# Patient Record
Sex: MALE | Race: WHITE | HISPANIC OR LATINO | URBAN - METROPOLITAN AREA
[De-identification: names, ages, dates, MRNs, and addresses within clinical notes are randomized per-mention and may not be internally consistent; named-entity substitution may affect disease eponyms.]

---

## 2021-08-11 ENCOUNTER — APPOINTMENT (RX ONLY)
Dept: URBAN - METROPOLITAN AREA CLINIC 37 | Facility: CLINIC | Age: 86
Setting detail: DERMATOLOGY
End: 2021-08-11

## 2021-08-11 ENCOUNTER — APPOINTMENT (RX ONLY)
Dept: URBAN - METROPOLITAN AREA CLINIC 38 | Facility: CLINIC | Age: 86
Setting detail: DERMATOLOGY
End: 2021-08-11

## 2021-08-11 PROBLEM — C44.311 BASAL CELL CARCINOMA OF SKIN OF NOSE: Status: ACTIVE | Noted: 2021-08-11

## 2021-08-11 PROCEDURE — ? TREATMENT REGIMEN

## 2021-08-11 PROCEDURE — 77280 THER RAD SIMULAJ FIELD SMPL: CPT

## 2021-08-11 PROCEDURE — 77300 RADIATION THERAPY DOSE PLAN: CPT

## 2021-08-11 PROCEDURE — G6001 ECHO GUIDANCE RADIOTHERAPY: HCPCS

## 2021-08-11 PROCEDURE — 77334 RADIATION TREATMENT AID(S): CPT

## 2021-08-11 PROCEDURE — 77261 THER RADIOLOGY TX PLNG SMPL: CPT

## 2021-08-11 PROCEDURE — ? SUPERFICIAL RADIATION TREATMENT

## 2021-08-11 PROCEDURE — 99213 OFFICE O/P EST LOW 20 MIN: CPT | Mod: 25

## 2021-08-11 NOTE — PROCEDURE: SUPERFICIAL RADIATION TREATMENT
Body Location Override (Optional): Nasal Supratip
Detail Level: Zone
Pathology Override (Pathology Will Render As Diagnosis Name If Left Blank): Basal Supratip
Field Number: 1
Dimensions-X Axis In Cm: 1.3
Dimensions-Y Axis In Cm: 1.5
Render Patient Eligibility And Selection In Note?: Yes
Render Additional Prescriptions In Note?: No
Please Choose The Type Of Visit (Required): Treatment Planning Visit: Show Non-Treatment Variables
Functional Status: 0 (fully active)
Port Dimensions-X Axis In Cm: 2.5
Field Size (Applicator): 3.0 cm
Patient Positioning: Sitting
Custom Shielding Preamble Text Will Not Be Included With Simple Simulations (.......... X X Y Cm): A lead shield of 0.762 mm thickness is utilized to form a molded, custom shield with a
Custom Shielding Afterword Text Will Not Be Included With Simple Simulations (X X Y Cm............): port to correlate with the lesion size, including treatment margin. The custom lead shield is adequate to accommodate the appropriate applicator and provide adequate shielding around the treatment site. Additional shielding (as noted below) is used to protect sensitive, normal tissues.
Initial Radiation Treatment Planning (Will Render If Bill Simulation = Yes): The patient had a complete consultation regarding all applicable modalities for the treatment of their skin cancer and based on a variety of factors including the type of tumor, size, and location, the relevant medical history as well as local tissue factors, the functional status of the individual, the ability to perform necessary postoperative wound instructions and the need for simultaneous treatments as well as overall wound healing status, it was determined that the patient would begin radiation therapy treatment for skin cancer.  A full simulation and treatment device design was performed including the determination and formulation of appropriate simple and complex devices including lead shield of 0.762 mm thickness to form molded customized shielding to specifically correlate with the lesion size including treatment margin.  The custom lead shield is adequate to accommodate the appropriate applicator and provide adequate shielding around the treatment site.  The specific field applicator, shields, and devices both simple and complex as well as the specific patient setup is outlined below.  The patient was given a full consent for superficial radiation to both verbally and in writing and the full determination of patient's eligibility for treatment and selection is outlined on the patient eligibility and treatment selection form.  The specific superficial radiotherapy prescription was determined and was documented on the superficial radiotherapy prescription form.  A treatment calculation was also performed and documented on the treatment calculation form.  Based on the prescription, the patient was scheduled for a series of fractional treatments.
Simple Simulation Preamble Text Will Be Included With Simple Simulations (.......... Indications): Simple simulation was performed today for the following reasons:
Treatment Device Design After Initial Simulation Justification (Will Render If Bill For Treatment Devices = Yes): The patient is status post radiation simulation and is evaluated as to the use of additional devices for shielding and placement for radiation therapy.
Additional Prescription Justification Text: If there is any interruption in treatment exceeding 5 days please see Decay and Dose Adjustment Calculation and complete treatment under Prescription 2, 3 or 4 as appropriate.
Shielding Size (Optional- Include Units): 2.5 cm
Energy (Optional-Please Include Units): 100 kv
Depth (Optional-Please Include Units): 2.17 mm
Fractions / Week: 3
Time Dose Fractionation (Optional- Include Units If Applicable): 97
Daily Fractionated Dose (Optional- Include Units): 273 cGy
Treatment Time / Fractionation (Optional- Include Units): 0.42 min
Total Number Of Fractions: 20
Total Dose (Optional-Please Include Units): 5460 cGy
Fractions / Week Rx 2: 5
Total Number Of Fractions Rx 2: 15
Dose Per Fractionation In Cgy (Optional): 273
Fractionation Number: 0
Assessment: Appropriate reaction
Intro Statement (Will Not Render If Left Blank): The patient is undergoing superficial radiation therapy for skin cancer and presents for weekly evaluation and management.  Per protocol and as documented on the flow sheet, the patient was questioned as to subjective redness, pruritus, pain, drainage, fatigue, or any other symptoms.  Objectively, the radiation area was evaluated with regards to erythema, atrophy, scale, crusting, erosion, ulceration, edema, purpura, tenderness, warmth, drainage, and any other findings.  The plan was extensively reviewed including the dose, and dosing schedule.  The simulation and clinical setup was also reviewed as was the external and any internal shields and based on this review the appropriateness and sufficiency of treatment was determined.
Information: Selecting Yes will display possible errors in your note based on the variables you have selected. This validation is only offered as a suggestion for you. PLEASE NOTE THAT THE VALIDATION TEXT WILL BE REMOVED WHEN YOU FINALIZE YOUR NOTE. IF YOU WANT TO FAX A PRELIMINARY NOTE YOU WILL NEED TO TOGGLE THIS TO 'NO' IF YOU DO NOT WANT IT IN YOUR FAXED NOTE.

## 2021-08-11 NOTE — PROCEDURE: TREATMENT REGIMEN
Plan: This patient has been treated today with image guided superficial radiation therapy for non-melanoma\\nskin cancer.\\nWritten informed consent has been previously obtained from this patient for this treatment. This\\nconsent is documented in the patient’s chart. The patient gave verbal consent to continue treatment\\ntoday.\\nThe patient was treated with a specific radiation dose and setup as prescribed by the provider listed on\\nthis visit note. A Radiation Therapist performed administration of radiation under supervision of\\nprovider. The treatment parameters and cumulative dose are indicated above.\\nPrior to administering the radiation, the patient underwent a verification therapeutic radiology\\nsimulation-aided field setting defining relevant normal and abnormal target anatomy and acquiring\\nimages with high frequency ultrasound in addition to data necessary developing optimal radiation\\ntreatment process for the patient. This process includes verification of the treatment port(s) and proper\\ntreatment positioning. All treatment ports were photographed within electronic medical record. The\\npatient’s customized lead blocking along with gross tumor volume and margin was\\nconfirmed. Considering superficial radiotherapy is clinical in setup, this requires physician and\\nradiation therapist to clarify location interest being treated against initial images, pathology and\\npatient anatomy. Care was taken ensuring fields treated were geometrically accurate and properly\\npositioned using therapeutic radiology simulation-aided field setting verification per fraction. This\\nprocess is also utilized to determine if any prescription or setup changes are necessary. These steps are\\ntherefore medically necessary ensuring safe and effective administration of radiation. Ongoing\\ntherapeutic radiology simulation-aided field setting verification is ordered throughout course of\\ntherapy.\\nA high frequency ultrasound image was acquired today for two-dimensional evaluation of the tumor\\nvolume and response to treatment, in addition to geometric accuracy of field placement. US depth Is\\n________mm, which is ________mm in difference from previous imaging. The field placement and\\nultrasound imaging, per fraction, is separate and distinct from the initial simulation, and is an\\nimportant task in providing safe administration of superficial radiation therapy. Physician evaluation of\\nthe ultrasound tumor depth will be ongoing throughout the course of treatment, and is deemed\\nmedically necessary in order to ensure the efficacy of treatment and any necessary changes. Today’s\\nimage was evaluated for determination of continuation of treatment with the current plan or with\\nnecessary changes as appropriate. According to provider review of verification therapeutic radiology\\nsimulation-aided field setting and imaging, no change is required. Additionally, the use of ultrasound visualization \\nand targeted assessment allows the patient to be able to see their cancer(s) progress, encouraging patient to\\ncomplete and maintain compliance through full course of radiotherapy.\\nPer Dr. Sousa, continued ultrasound guidance and therapeutic radiology simulation-aided field setting\\nverification per fraction is required for field placement, measurement of tumor depth, progress and\\nacute effect monitoring.\\n\\n
Detail Level: Zone

## 2021-08-11 NOTE — HPI: EVALUATION OF SKIN LESION(S)
How Severe Are Your Spot(S)?: mild
Have Your Spot(S) Been Treated In The Past?: has been treated
Hpi Title: Evaluation of a Skin Lesion
When Was It Treated?: 10/22/2020
Year Removed: 1900

## 2021-08-11 NOTE — PROCEDURE: TREATMENT REGIMEN
Plan: This patient has been treated today with image guided superficial radiation therapy for non-melanoma\\nskin cancer.\\nWritten informed consent has been previously obtained from this patient for this treatment. This\\nconsent is documented in the patient’s chart. The patient gave verbal consent to continue treatment\\ntoday.\\nThe patient was treated with a specific radiation dose and setup as prescribed by the provider listed on\\nthis visit note. A Radiation Therapist performed administration of radiation under supervision of\\nprovider. The treatment parameters and cumulative dose are indicated above.\\nPrior to administering the radiation, the patient underwent a verification therapeutic radiology\\nsimulation-aided field setting defining relevant normal and abnormal target anatomy and acquiring\\nimages with high frequency ultrasound in addition to data necessary developing optimal radiation\\ntreatment process for the patient. This process includes verification of the treatment port(s) and proper\\ntreatment positioning. All treatment ports were photographed within electronic medical record. The\\npatient’s customized lead blocking along with gross tumor volume and margin was\\nconfirmed. Considering superficial radiotherapy is clinical in setup, this requires physician and\\nradiation therapist to clarify location interest being treated against initial images, pathology and\\npatient anatomy. Care was taken ensuring fields treated were geometrically accurate and properly\\npositioned using therapeutic radiology simulation-aided field setting verification per fraction. This\\nprocess is also utilized to determine if any prescription or setup changes are necessary. These steps are\\ntherefore medically necessary ensuring safe and effective administration of radiation. Ongoing\\ntherapeutic radiology simulation-aided field setting verification is ordered throughout course of\\ntherapy.\\nA high frequency ultrasound image was acquired today for two-dimensional evaluation of the tumor\\nvolume and response to treatment, in addition to geometric accuracy of field placement. US depth Is\\n________mm, which is ________mm in difference from previous imaging. The field placement and\\nultrasound imaging, per fraction, is separate and distinct from the initial simulation, and is an\\nimportant task in providing safe administration of superficial radiation therapy. Physician evaluation of\\nthe ultrasound tumor depth will be ongoing throughout the course of treatment, and is deemed\\nmedically necessary in order to ensure the efficacy of treatment and any necessary changes. Today’s\\nimage was evaluated for determination of continuation of treatment with the current plan or with\\nnecessary changes as appropriate. According to provider review of verification therapeutic radiology\\nsimulation-aided field setting and imaging, no change is required. Additionally, the use of ultrasound visualization \\nand targeted assessment allows the patient to be able to see their cancer(s) progress, encouraging patient to\\ncomplete and maintain compliance through full course of radiotherapy.\\nPer Dr. Sheriff, continued ultrasound guidance and therapeutic radiology simulation-aided field setting\\nverification per fraction is required for field placement, measurement of tumor depth, progress and\\nacute effect monitoring.\\n\\n
Detail Level: Zone

## 2021-08-11 NOTE — HPI: EVALUATION OF SKIN LESION(S)
Hpi Title: Evaluation of a Skin Lesion
How Severe Are Your Spot(S)?: mild
Have Your Spot(S) Been Treated In The Past?: has been treated
When Was It Treated?: 10/22/2020
Year Removed: 1900

## 2021-08-11 NOTE — PROCEDURE: SUPERFICIAL RADIATION TREATMENT
Detail Level: Zone
Fractions / Week: 3
Initial Radiation Treatment Planning (Will Render If Bill Simulation = Yes): The patient had a complete consultation regarding all applicable modalities for the treatment of their skin cancer and based on a variety of factors including the type of tumor, size, and location, the relevant medical history as well as local tissue factors, the functional status of the individual, the ability to perform necessary postoperative wound instructions and the need for simultaneous treatments as well as overall wound healing status, it was determined that the patient would begin radiation therapy treatment for skin cancer.  A full simulation and treatment device design was performed including the determination and formulation of appropriate simple and complex devices including lead shield of 0.762 mm thickness to form molded customized shielding to specifically correlate with the lesion size including treatment margin.  The custom lead shield is adequate to accommodate the appropriate applicator and provide adequate shielding around the treatment site.  The specific field applicator, shields, and devices both simple and complex as well as the specific patient setup is outlined below.  The patient was given a full consent for superficial radiation to both verbally and in writing and the full determination of patient's eligibility for treatment and selection is outlined on the patient eligibility and treatment selection form.  The specific superficial radiotherapy prescription was determined and was documented on the superficial radiotherapy prescription form.  A treatment calculation was also performed and documented on the treatment calculation form.  Based on the prescription, the patient was scheduled for a series of fractional treatments.
Fractions / Week Rx 4: 5
Include Rx 4 When Rendering Additional Prescriptions: No
Pathology Override (Pathology Will Render As Diagnosis Name If Left Blank): Basal Supratip
Total Number Of Fractions Rx 2: 15
Total Dose (Optional-Please Include Units): 5460 cGy
Field Size (Applicator): 3.0 cm
Render Patient Eligibility And Selection In Note?: Yes
Shielding Size (Optional- Include Units): 2.5 cm
Port Dimensions-Y Axis In Cm: 2.5
Daily Fractionated Dose (Optional- Include Units): 273 cGy
Intro Statement (Will Not Render If Left Blank): The patient is undergoing superficial radiation therapy for skin cancer and presents for weekly evaluation and management.  Per protocol and as documented on the flow sheet, the patient was questioned as to subjective redness, pruritus, pain, drainage, fatigue, or any other symptoms.  Objectively, the radiation area was evaluated with regards to erythema, atrophy, scale, crusting, erosion, ulceration, edema, purpura, tenderness, warmth, drainage, and any other findings.  The plan was extensively reviewed including the dose, and dosing schedule.  The simulation and clinical setup was also reviewed as was the external and any internal shields and based on this review the appropriateness and sufficiency of treatment was determined.
Information: Selecting Yes will display possible errors in your note based on the variables you have selected. This validation is only offered as a suggestion for you. PLEASE NOTE THAT THE VALIDATION TEXT WILL BE REMOVED WHEN YOU FINALIZE YOUR NOTE. IF YOU WANT TO FAX A PRELIMINARY NOTE YOU WILL NEED TO TOGGLE THIS TO 'NO' IF YOU DO NOT WANT IT IN YOUR FAXED NOTE.
Please Choose The Type Of Visit (Required): Treatment Planning Visit: Show Non-Treatment Variables
Assessment: Appropriate reaction
Dimensions-X Axis In Cm: 1.3
Fractionation Number: 0
Body Location Override (Optional): Nasal Supratip
Depth (Optional-Please Include Units): 2.17 mm
Dose Per Fractionation In Cgy (Optional): 273
Custom Shielding Afterword Text Will Not Be Included With Simple Simulations (X X Y Cm............): port to correlate with the lesion size, including treatment margin. The custom lead shield is adequate to accommodate the appropriate applicator and provide adequate shielding around the treatment site. Additional shielding (as noted below) is used to protect sensitive, normal tissues.
Total Number Of Fractions: 20
Additional Prescription Justification Text: If there is any interruption in treatment exceeding 5 days please see Decay and Dose Adjustment Calculation and complete treatment under Prescription 2, 3 or 4 as appropriate.
Number Of Days Off Treatment: 1
Patient Positioning: Sitting
Time Dose Fractionation (Optional- Include Units If Applicable): 97
Simple Simulation Preamble Text Will Be Included With Simple Simulations (.......... Indications): Simple simulation was performed today for the following reasons:
Energy (Optional-Please Include Units): 100 kv
Functional Status: 0 (fully active)
Custom Shielding Preamble Text Will Not Be Included With Simple Simulations (.......... X X Y Cm): A lead shield of 0.762 mm thickness is utilized to form a molded, custom shield with a
Treatment Device Design After Initial Simulation Justification (Will Render If Bill For Treatment Devices = Yes): The patient is status post radiation simulation and is evaluated as to the use of additional devices for shielding and placement for radiation therapy.
Treatment Time / Fractionation (Optional- Include Units): 0.42 min
Dimensions-Y Axis In Cm: 1.5

## 2021-08-12 ENCOUNTER — APPOINTMENT (RX ONLY)
Dept: URBAN - METROPOLITAN AREA CLINIC 38 | Facility: CLINIC | Age: 86
Setting detail: DERMATOLOGY
End: 2021-08-12

## 2021-08-12 ENCOUNTER — APPOINTMENT (RX ONLY)
Dept: URBAN - METROPOLITAN AREA CLINIC 37 | Facility: CLINIC | Age: 86
Setting detail: DERMATOLOGY
End: 2021-08-12

## 2021-08-12 PROBLEM — C44.311 BASAL CELL CARCINOMA OF SKIN OF NOSE: Status: ACTIVE | Noted: 2021-08-12

## 2021-08-12 PROCEDURE — 99024 POSTOP FOLLOW-UP VISIT: CPT

## 2021-08-12 PROCEDURE — ? POST-OP WOUND CHECK

## 2021-08-12 NOTE — PROCEDURE: POST-OP WOUND CHECK
Additional Comments: We debulked the BCC with a shave blade to allow SRT to work better
Add 08544 Cpt? (Important Note: In 2017 The Use Of 96461 Is Being Tracked By Cms To Determine Future Global Period Reimbursement For Global Periods): no
Detail Level: Detailed

## 2021-08-12 NOTE — PROCEDURE: POST-OP WOUND CHECK
Detail Level: Detailed
Add 84879 Cpt? (Important Note: In 2017 The Use Of 80489 Is Being Tracked By Cms To Determine Future Global Period Reimbursement For Global Periods): no
Additional Comments: We debulked the BCC with a shave blade to allow SRT to work better

## 2021-08-16 ENCOUNTER — APPOINTMENT (RX ONLY)
Dept: URBAN - METROPOLITAN AREA CLINIC 37 | Facility: CLINIC | Age: 86
Setting detail: DERMATOLOGY
End: 2021-08-16

## 2021-08-16 ENCOUNTER — APPOINTMENT (RX ONLY)
Dept: URBAN - METROPOLITAN AREA CLINIC 38 | Facility: CLINIC | Age: 86
Setting detail: DERMATOLOGY
End: 2021-08-16

## 2021-08-16 PROBLEM — C44.311 BASAL CELL CARCINOMA OF SKIN OF NOSE: Status: ACTIVE | Noted: 2021-08-16

## 2021-08-16 PROCEDURE — ? TREATMENT REGIMEN

## 2021-08-16 PROCEDURE — 77280 THER RAD SIMULAJ FIELD SMPL: CPT

## 2021-08-16 PROCEDURE — ? SUPERFICIAL RADIATION TREATMENT

## 2021-08-16 PROCEDURE — G6001 ECHO GUIDANCE RADIOTHERAPY: HCPCS

## 2021-08-16 PROCEDURE — 77401: CPT

## 2021-08-16 NOTE — PROCEDURE: TREATMENT REGIMEN
Detail Level: Zone
Plan: This patient has been treated today with image guided superficial radiation therapy for non-melanoma\\nskin cancer.\\nWritten informed consent has been previously obtained from this patient for this treatment. This\\nconsent is documented in the patient’s chart. The patient gave verbal consent to continue treatment\\ntoday.\\nThe patient was treated with a specific radiation dose and setup as prescribed by the provider listed on\\nthis visit note. A Radiation Therapist performed administration of radiation under supervision of\\nprovider. The treatment parameters and cumulative dose are indicated above.\\nPrior to administering the radiation, the patient underwent a verification therapeutic radiology\\nsimulation-aided field setting defining relevant normal and abnormal target anatomy and acquiring\\nimages with high frequency ultrasound in addition to data necessary developing optimal radiation\\ntreatment process for the patient. This process includes verification of the treatment port(s) and proper\\ntreatment positioning. All treatment ports were photographed within electronic medical record. The\\npatient’s customized lead blocking along with gross tumor volume and margin was\\nconfirmed. Considering superficial radiotherapy is clinical in setup, this requires physician and\\nradiation therapist to clarify location interest being treated against initial images, pathology and\\npatient anatomy. Care was taken ensuring fields treated were geometrically accurate and properly\\npositioned using therapeutic radiology simulation-aided field setting verification per fraction. This\\nprocess is also utilized to determine if any prescription or setup changes are necessary. These steps are\\ntherefore medically necessary ensuring safe and effective administration of radiation. Ongoing\\ntherapeutic radiology simulation-aided field setting verification is ordered throughout course of\\ntherapy.\\nA high frequency ultrasound image was acquired today for two-dimensional evaluation of the tumor\\nvolume and response to treatment, in addition to geometric accuracy of field placement. US depth Is\\n________mm, which is ________mm in difference from previous imaging. The field placement and\\nultrasound imaging, per fraction, is separate and distinct from the initial simulation, and is an\\nimportant task in providing safe administration of superficial radiation therapy. Physician evaluation of\\nthe ultrasound tumor depth will be ongoing throughout the course of treatment, and is deemed\\nmedically necessary in order to ensure the efficacy of treatment and any necessary changes. Today’s\\nimage was evaluated for determination of continuation of treatment with the current plan or with\\nnecessary changes as appropriate. According to provider review of verification therapeutic radiology\\nsimulation-aided field setting and imaging, no change is required. Additionally, the use of ultrasound visualization \\nand targeted assessment allows the patient to be able to see their cancer(s) progress, encouraging patient to\\ncomplete and maintain compliance through full course of radiotherapy.\\nPer Dr. Suosa, continued ultrasound guidance and therapeutic radiology simulation-aided field setting\\nverification per fraction is required for field placement, measurement of tumor depth, progress and\\nacute effect monitoring.\\n\\n

## 2021-08-16 NOTE — PROCEDURE: TREATMENT REGIMEN
Detail Level: Zone
Plan: This patient has been treated today with image guided superficial radiation therapy for non-melanoma\\nskin cancer.\\nWritten informed consent has been previously obtained from this patient for this treatment. This\\nconsent is documented in the patient’s chart. The patient gave verbal consent to continue treatment\\ntoday.\\nThe patient was treated with a specific radiation dose and setup as prescribed by the provider listed on\\nthis visit note. A Radiation Therapist performed administration of radiation under supervision of\\nprovider. The treatment parameters and cumulative dose are indicated above.\\nPrior to administering the radiation, the patient underwent a verification therapeutic radiology\\nsimulation-aided field setting defining relevant normal and abnormal target anatomy and acquiring\\nimages with high frequency ultrasound in addition to data necessary developing optimal radiation\\ntreatment process for the patient. This process includes verification of the treatment port(s) and proper\\ntreatment positioning. All treatment ports were photographed within electronic medical record. The\\npatient’s customized lead blocking along with gross tumor volume and margin was\\nconfirmed. Considering superficial radiotherapy is clinical in setup, this requires physician and\\nradiation therapist to clarify location interest being treated against initial images, pathology and\\npatient anatomy. Care was taken ensuring fields treated were geometrically accurate and properly\\npositioned using therapeutic radiology simulation-aided field setting verification per fraction. This\\nprocess is also utilized to determine if any prescription or setup changes are necessary. These steps are\\ntherefore medically necessary ensuring safe and effective administration of radiation. Ongoing\\ntherapeutic radiology simulation-aided field setting verification is ordered throughout course of\\ntherapy.\\nA high frequency ultrasound image was acquired today for two-dimensional evaluation of the tumor\\nvolume and response to treatment, in addition to geometric accuracy of field placement. US depth Is\\n________mm, which is ________mm in difference from previous imaging. The field placement and\\nultrasound imaging, per fraction, is separate and distinct from the initial simulation, and is an\\nimportant task in providing safe administration of superficial radiation therapy. Physician evaluation of\\nthe ultrasound tumor depth will be ongoing throughout the course of treatment, and is deemed\\nmedically necessary in order to ensure the efficacy of treatment and any necessary changes. Today’s\\nimage was evaluated for determination of continuation of treatment with the current plan or with\\nnecessary changes as appropriate. According to provider review of verification therapeutic radiology\\nsimulation-aided field setting and imaging, no change is required. Additionally, the use of ultrasound visualization \\nand targeted assessment allows the patient to be able to see their cancer(s) progress, encouraging patient to\\ncomplete and maintain compliance through full course of radiotherapy.\\nPer Dr. Sheriff, continued ultrasound guidance and therapeutic radiology simulation-aided field setting\\nverification per fraction is required for field placement, measurement of tumor depth, progress and\\nacute effect monitoring.\\n\\n

## 2021-08-16 NOTE — PROCEDURE: SUPERFICIAL RADIATION TREATMENT
Body Location Override (Optional): Nasal Supratip
Detail Level: Zone
Pathology Override (Pathology Will Render As Diagnosis Name If Left Blank): Basal Supratip
Field Number: 1
Dimensions-X Axis In Cm: 1.3
Dimensions-Y Axis In Cm: 1.5
Render Patient Eligibility And Selection In Note?: Yes
Render Additional Prescriptions In Note?: No
Please Choose The Type Of Visit (Required): Treatment Visit: Show Treatment Variables
Functional Status: 0 (fully active)
Port Dimensions-X Axis In Cm: 2.5
Field Size (Applicator): 3.0 cm
Patient Positioning: Sitting
Custom Shielding Preamble Text Will Not Be Included With Simple Simulations (.......... X X Y Cm): A lead shield of 0.762 mm thickness is utilized to form a molded, custom shield with a
Custom Shielding Afterword Text Will Not Be Included With Simple Simulations (X X Y Cm............): port to correlate with the lesion size, including treatment margin. The custom lead shield is adequate to accommodate the appropriate applicator and provide adequate shielding around the treatment site. Additional shielding (as noted below) is used to protect sensitive, normal tissues.
Initial Radiation Treatment Planning (Will Render If Bill Simulation = Yes): The patient had a complete consultation regarding all applicable modalities for the treatment of their skin cancer and based on a variety of factors including the type of tumor, size, and location, the relevant medical history as well as local tissue factors, the functional status of the individual, the ability to perform necessary postoperative wound instructions and the need for simultaneous treatments as well as overall wound healing status, it was determined that the patient would begin radiation therapy treatment for skin cancer.  A full simulation and treatment device design was performed including the determination and formulation of appropriate simple and complex devices including lead shield of 0.762 mm thickness to form molded customized shielding to specifically correlate with the lesion size including treatment margin.  The custom lead shield is adequate to accommodate the appropriate applicator and provide adequate shielding around the treatment site.  The specific field applicator, shields, and devices both simple and complex as well as the specific patient setup is outlined below.  The patient was given a full consent for superficial radiation to both verbally and in writing and the full determination of patient's eligibility for treatment and selection is outlined on the patient eligibility and treatment selection form.  The specific superficial radiotherapy prescription was determined and was documented on the superficial radiotherapy prescription form.  A treatment calculation was also performed and documented on the treatment calculation form.  Based on the prescription, the patient was scheduled for a series of fractional treatments.
Simple Simulation Preamble Text Will Be Included With Simple Simulations (.......... Indications): Simple simulation was performed today for the following reasons:
Treatment Device Design After Initial Simulation Justification (Will Render If Bill For Treatment Devices = Yes): The patient is status post radiation simulation and is evaluated as to the use of additional devices for shielding and placement for radiation therapy.
Additional Prescription Justification Text: If there is any interruption in treatment exceeding 5 days please see Decay and Dose Adjustment Calculation and complete treatment under Prescription 2, 3 or 4 as appropriate.
Shielding Size (Optional- Include Units): 2.5 cm
Energy (Optional-Please Include Units): 100 kv
Depth (Optional-Please Include Units): 2.17 mm
Fractions / Week: 3
Time Dose Fractionation (Optional- Include Units If Applicable): 97
Daily Fractionated Dose (Optional- Include Units): 273 cGy
Treatment Time / Fractionation (Optional- Include Units): 0.42 min
Total Number Of Fractions: 20
Total Dose (Optional-Please Include Units): 5460 cGy
Fractions / Week Rx 2: 5
Total Number Of Fractions Rx 2: 15
Dose Per Fractionation In Cgy (Optional): 273
Day Of The Week Treatment Administered: Monday
Assessment: Appropriate reaction
Intro Statement (Will Not Render If Left Blank): The patient is undergoing superficial radiation therapy for skin cancer and presents for weekly evaluation and management.  Per protocol and as documented on the flow sheet, the patient was questioned as to subjective redness, pruritus, pain, drainage, fatigue, or any other symptoms.  Objectively, the radiation area was evaluated with regards to erythema, atrophy, scale, crusting, erosion, ulceration, edema, purpura, tenderness, warmth, drainage, and any other findings.  The plan was extensively reviewed including the dose, and dosing schedule.  The simulation and clinical setup was also reviewed as was the external and any internal shields and based on this review the appropriateness and sufficiency of treatment was determined.
Information: Selecting Yes will display possible errors in your note based on the variables you have selected. This validation is only offered as a suggestion for you. PLEASE NOTE THAT THE VALIDATION TEXT WILL BE REMOVED WHEN YOU FINALIZE YOUR NOTE. IF YOU WANT TO FAX A PRELIMINARY NOTE YOU WILL NEED TO TOGGLE THIS TO 'NO' IF YOU DO NOT WANT IT IN YOUR FAXED NOTE.

## 2021-08-17 ENCOUNTER — APPOINTMENT (RX ONLY)
Dept: URBAN - METROPOLITAN AREA CLINIC 37 | Facility: CLINIC | Age: 86
Setting detail: DERMATOLOGY
End: 2021-08-17

## 2021-08-17 ENCOUNTER — APPOINTMENT (RX ONLY)
Dept: URBAN - METROPOLITAN AREA CLINIC 38 | Facility: CLINIC | Age: 86
Setting detail: DERMATOLOGY
End: 2021-08-17

## 2021-08-17 PROBLEM — C44.311 BASAL CELL CARCINOMA OF SKIN OF NOSE: Status: ACTIVE | Noted: 2021-08-17

## 2021-08-17 PROCEDURE — G6001 ECHO GUIDANCE RADIOTHERAPY: HCPCS

## 2021-08-17 PROCEDURE — ? TREATMENT REGIMEN

## 2021-08-17 PROCEDURE — 77401: CPT

## 2021-08-17 PROCEDURE — 77280 THER RAD SIMULAJ FIELD SMPL: CPT

## 2021-08-17 PROCEDURE — ? SUPERFICIAL RADIATION TREATMENT

## 2021-08-17 NOTE — PROCEDURE: SUPERFICIAL RADIATION TREATMENT
Body Location Override (Optional): Nasal Supratip
Detail Level: Zone
Pathology Override (Pathology Will Render As Diagnosis Name If Left Blank): Basal Supratip
Field Number: 1
Dimensions-X Axis In Cm: 1.3
Dimensions-Y Axis In Cm: 1.5
Render Patient Eligibility And Selection In Note?: Yes
Render Additional Prescriptions In Note?: No
Please Choose The Type Of Visit (Required): Treatment Visit: Show Treatment Variables
Functional Status: 0 (fully active)
Port Dimensions-X Axis In Cm: 2.5
Field Size (Applicator): 3.0 cm
Patient Positioning: Sitting
Custom Shielding Preamble Text Will Not Be Included With Simple Simulations (.......... X X Y Cm): A lead shield of 0.762 mm thickness is utilized to form a molded, custom shield with a
Custom Shielding Afterword Text Will Not Be Included With Simple Simulations (X X Y Cm............): port to correlate with the lesion size, including treatment margin. The custom lead shield is adequate to accommodate the appropriate applicator and provide adequate shielding around the treatment site. Additional shielding (as noted below) is used to protect sensitive, normal tissues.
Initial Radiation Treatment Planning (Will Render If Bill Simulation = Yes): The patient had a complete consultation regarding all applicable modalities for the treatment of their skin cancer and based on a variety of factors including the type of tumor, size, and location, the relevant medical history as well as local tissue factors, the functional status of the individual, the ability to perform necessary postoperative wound instructions and the need for simultaneous treatments as well as overall wound healing status, it was determined that the patient would begin radiation therapy treatment for skin cancer.  A full simulation and treatment device design was performed including the determination and formulation of appropriate simple and complex devices including lead shield of 0.762 mm thickness to form molded customized shielding to specifically correlate with the lesion size including treatment margin.  The custom lead shield is adequate to accommodate the appropriate applicator and provide adequate shielding around the treatment site.  The specific field applicator, shields, and devices both simple and complex as well as the specific patient setup is outlined below.  The patient was given a full consent for superficial radiation to both verbally and in writing and the full determination of patient's eligibility for treatment and selection is outlined on the patient eligibility and treatment selection form.  The specific superficial radiotherapy prescription was determined and was documented on the superficial radiotherapy prescription form.  A treatment calculation was also performed and documented on the treatment calculation form.  Based on the prescription, the patient was scheduled for a series of fractional treatments.
Simple Simulation Preamble Text Will Be Included With Simple Simulations (.......... Indications): Simple simulation was performed today for the following reasons:
Treatment Device Design After Initial Simulation Justification (Will Render If Bill For Treatment Devices = Yes): The patient is status post radiation simulation and is evaluated as to the use of additional devices for shielding and placement for radiation therapy.
Additional Prescription Justification Text: If there is any interruption in treatment exceeding 5 days please see Decay and Dose Adjustment Calculation and complete treatment under Prescription 2, 3 or 4 as appropriate.
Shielding Size (Optional- Include Units): 2.5 cm
Energy (Optional-Please Include Units): 100 kv
Depth (Optional-Please Include Units): 2.17 mm
Fractions / Week: 3
Time Dose Fractionation (Optional- Include Units If Applicable): 97
Daily Fractionated Dose (Optional- Include Units): 273 cGy
Treatment Time / Fractionation (Optional- Include Units): 0.42 min
Total Number Of Fractions: 20
Total Dose (Optional-Please Include Units): 5460 cGy
Fractions / Week Rx 2: 5
Total Number Of Fractions Rx 2: 15
Dose Per Fractionation In Cgy (Optional): 273
Day Of The Week Treatment Administered: Tuesday
Fractionation Number: 2
Cumulative Dose In Cgy (Optional): 546
Assessment: Appropriate reaction
Intro Statement (Will Not Render If Left Blank): The patient is undergoing superficial radiation therapy for skin cancer and presents for weekly evaluation and management.  Per protocol and as documented on the flow sheet, the patient was questioned as to subjective redness, pruritus, pain, drainage, fatigue, or any other symptoms.  Objectively, the radiation area was evaluated with regards to erythema, atrophy, scale, crusting, erosion, ulceration, edema, purpura, tenderness, warmth, drainage, and any other findings.  The plan was extensively reviewed including the dose, and dosing schedule.  The simulation and clinical setup was also reviewed as was the external and any internal shields and based on this review the appropriateness and sufficiency of treatment was determined.
Information: Selecting Yes will display possible errors in your note based on the variables you have selected. This validation is only offered as a suggestion for you. PLEASE NOTE THAT THE VALIDATION TEXT WILL BE REMOVED WHEN YOU FINALIZE YOUR NOTE. IF YOU WANT TO FAX A PRELIMINARY NOTE YOU WILL NEED TO TOGGLE THIS TO 'NO' IF YOU DO NOT WANT IT IN YOUR FAXED NOTE.

## 2021-08-17 NOTE — PROCEDURE: SUPERFICIAL RADIATION TREATMENT
Fractions / Week: 3
Include Rx 3 When Rendering Additional Prescriptions: No
Energy (Optional-Please Include Units): 100 kv
Number Of Days Off Treatment: 1
Body Location Override (Optional): Nasal Supratip
Total Number Of Fractions Rx 4: 15
Daily Fractionated Dose (Optional- Include Units): 273 cGy
Field Size (Applicator): 3.0 cm
Pathology Override (Pathology Will Render As Diagnosis Name If Left Blank): Basal Supratip
Cumulative Dose In Cgy (Optional): 546
Functional Status: 0 (fully active)
Total Dose (Optional-Please Include Units): 5460 cGy
Initial Radiation Treatment Planning (Will Render If Bill Simulation = Yes): The patient had a complete consultation regarding all applicable modalities for the treatment of their skin cancer and based on a variety of factors including the type of tumor, size, and location, the relevant medical history as well as local tissue factors, the functional status of the individual, the ability to perform necessary postoperative wound instructions and the need for simultaneous treatments as well as overall wound healing status, it was determined that the patient would begin radiation therapy treatment for skin cancer.  A full simulation and treatment device design was performed including the determination and formulation of appropriate simple and complex devices including lead shield of 0.762 mm thickness to form molded customized shielding to specifically correlate with the lesion size including treatment margin.  The custom lead shield is adequate to accommodate the appropriate applicator and provide adequate shielding around the treatment site.  The specific field applicator, shields, and devices both simple and complex as well as the specific patient setup is outlined below.  The patient was given a full consent for superficial radiation to both verbally and in writing and the full determination of patient's eligibility for treatment and selection is outlined on the patient eligibility and treatment selection form.  The specific superficial radiotherapy prescription was determined and was documented on the superficial radiotherapy prescription form.  A treatment calculation was also performed and documented on the treatment calculation form.  Based on the prescription, the patient was scheduled for a series of fractional treatments.
Fractions / Week Rx 2: 5
Port Dimensions-Y Axis In Cm: 2.5
Custom Shielding Preamble Text Will Not Be Included With Simple Simulations (.......... X X Y Cm): A lead shield of 0.762 mm thickness is utilized to form a molded, custom shield with a
Dose / Tx In Cgy (Optional): 273
Bill For Radiation Treatment: Yes
Time Dose Fractionation (Optional- Include Units If Applicable): 97
Additional Prescription Justification Text: If there is any interruption in treatment exceeding 5 days please see Decay and Dose Adjustment Calculation and complete treatment under Prescription 2, 3 or 4 as appropriate.
Depth (Optional-Please Include Units): 2.17 mm
Treatment Time / Fractionation (Optional- Include Units): 0.42 min
Patient Positioning: Sitting
Dimensions-Y Axis In Cm: 1.5
Shielding Size (Optional- Include Units): 2.5 cm
Detail Level: Zone
Custom Shielding Afterword Text Will Not Be Included With Simple Simulations (X X Y Cm............): port to correlate with the lesion size, including treatment margin. The custom lead shield is adequate to accommodate the appropriate applicator and provide adequate shielding around the treatment site. Additional shielding (as noted below) is used to protect sensitive, normal tissues.
Total Number Of Fractions: 20
Information: Selecting Yes will display possible errors in your note based on the variables you have selected. This validation is only offered as a suggestion for you. PLEASE NOTE THAT THE VALIDATION TEXT WILL BE REMOVED WHEN YOU FINALIZE YOUR NOTE. IF YOU WANT TO FAX A PRELIMINARY NOTE YOU WILL NEED TO TOGGLE THIS TO 'NO' IF YOU DO NOT WANT IT IN YOUR FAXED NOTE.
Day Of The Week Treatment Administered: Tuesday
Simple Simulation Preamble Text Will Be Included With Simple Simulations (.......... Indications): Simple simulation was performed today for the following reasons:
Treatment Device Design After Initial Simulation Justification (Will Render If Bill For Treatment Devices = Yes): The patient is status post radiation simulation and is evaluated as to the use of additional devices for shielding and placement for radiation therapy.
Intro Statement (Will Not Render If Left Blank): The patient is undergoing superficial radiation therapy for skin cancer and presents for weekly evaluation and management.  Per protocol and as documented on the flow sheet, the patient was questioned as to subjective redness, pruritus, pain, drainage, fatigue, or any other symptoms.  Objectively, the radiation area was evaluated with regards to erythema, atrophy, scale, crusting, erosion, ulceration, edema, purpura, tenderness, warmth, drainage, and any other findings.  The plan was extensively reviewed including the dose, and dosing schedule.  The simulation and clinical setup was also reviewed as was the external and any internal shields and based on this review the appropriateness and sufficiency of treatment was determined.
Fractionation Number: 2
Please Choose The Type Of Visit (Required): Treatment Visit: Show Treatment Variables
Dimensions-X Axis In Cm: 1.3
Assessment: Appropriate reaction

## 2021-08-17 NOTE — PROCEDURE: TREATMENT REGIMEN
Detail Level: Zone
Plan: This patient has been treated today with image guided superficial radiation therapy for non-melanoma\\nskin cancer.\\nWritten informed consent has been previously obtained from this patient for this treatment. This\\nconsent is documented in the patient’s chart. The patient gave verbal consent to continue treatment\\ntoday.\\nThe patient was treated with a specific radiation dose and setup as prescribed by the provider listed on\\nthis visit note. A Radiation Therapist performed administration of radiation under supervision of\\nprovider. The treatment parameters and cumulative dose are indicated above.\\nPrior to administering the radiation, the patient underwent a verification therapeutic radiology\\nsimulation-aided field setting defining relevant normal and abnormal target anatomy and acquiring\\nimages with high frequency ultrasound in addition to data necessary developing optimal radiation\\ntreatment process for the patient. This process includes verification of the treatment port(s) and proper\\ntreatment positioning. All treatment ports were photographed within electronic medical record. The\\npatient’s customized lead blocking along with gross tumor volume and margin was\\nconfirmed. Considering superficial radiotherapy is clinical in setup, this requires physician and\\nradiation therapist to clarify location interest being treated against initial images, pathology and\\npatient anatomy. Care was taken ensuring fields treated were geometrically accurate and properly\\npositioned using therapeutic radiology simulation-aided field setting verification per fraction. This\\nprocess is also utilized to determine if any prescription or setup changes are necessary. These steps are\\ntherefore medically necessary ensuring safe and effective administration of radiation. Ongoing\\ntherapeutic radiology simulation-aided field setting verification is ordered throughout course of\\ntherapy.\\nA high frequency ultrasound image was acquired today for two-dimensional evaluation of the tumor\\nvolume and response to treatment, in addition to geometric accuracy of field placement. US depth Is\\n________mm, which is ________mm in difference from previous imaging. The field placement and\\nultrasound imaging, per fraction, is separate and distinct from the initial simulation, and is an\\nimportant task in providing safe administration of superficial radiation therapy. Physician evaluation of\\nthe ultrasound tumor depth will be ongoing throughout the course of treatment, and is deemed\\nmedically necessary in order to ensure the efficacy of treatment and any necessary changes. Today’s\\nimage was evaluated for determination of continuation of treatment with the current plan or with\\nnecessary changes as appropriate. According to provider review of verification therapeutic radiology\\nsimulation-aided field setting and imaging, no change is required. Additionally, the use of ultrasound visualization \\nand targeted assessment allows the patient to be able to see their cancer(s) progress, encouraging patient to\\ncomplete and maintain compliance through full course of radiotherapy.\\nPer Dr. Sousa, continued ultrasound guidance and therapeutic radiology simulation-aided field setting\\nverification per fraction is required for field placement, measurement of tumor depth, progress and\\nacute effect monitoring.\\n\\n

## 2021-08-19 ENCOUNTER — APPOINTMENT (RX ONLY)
Dept: URBAN - METROPOLITAN AREA CLINIC 37 | Facility: CLINIC | Age: 86
Setting detail: DERMATOLOGY
End: 2021-08-19

## 2021-08-19 ENCOUNTER — APPOINTMENT (RX ONLY)
Dept: URBAN - METROPOLITAN AREA CLINIC 38 | Facility: CLINIC | Age: 86
Setting detail: DERMATOLOGY
End: 2021-08-19

## 2021-08-19 PROBLEM — C44.311 BASAL CELL CARCINOMA OF SKIN OF NOSE: Status: ACTIVE | Noted: 2021-08-19

## 2021-08-19 PROCEDURE — ? TREATMENT REGIMEN

## 2021-08-19 PROCEDURE — ? SUPERFICIAL RADIATION TREATMENT

## 2021-08-19 PROCEDURE — G6001 ECHO GUIDANCE RADIOTHERAPY: HCPCS

## 2021-08-19 PROCEDURE — 77401: CPT

## 2021-08-19 PROCEDURE — 77280 THER RAD SIMULAJ FIELD SMPL: CPT

## 2021-08-19 NOTE — PROCEDURE: SUPERFICIAL RADIATION TREATMENT
Body Location Override (Optional): Nasal Supratip
Detail Level: Zone
Pathology Override (Pathology Will Render As Diagnosis Name If Left Blank): Basal Supratip
Field Number: 1
Dimensions-X Axis In Cm: 1.3
Dimensions-Y Axis In Cm: 1.5
Render Patient Eligibility And Selection In Note?: Yes
Render Additional Prescriptions In Note?: No
Please Choose The Type Of Visit (Required): Treatment Visit: Show Treatment Variables
Functional Status: 0 (fully active)
Port Dimensions-X Axis In Cm: 2.5
Field Size (Applicator): 3.0 cm
Patient Positioning: Sitting
Custom Shielding Preamble Text Will Not Be Included With Simple Simulations (.......... X X Y Cm): A lead shield of 0.762 mm thickness is utilized to form a molded, custom shield with a
Custom Shielding Afterword Text Will Not Be Included With Simple Simulations (X X Y Cm............): port to correlate with the lesion size, including treatment margin. The custom lead shield is adequate to accommodate the appropriate applicator and provide adequate shielding around the treatment site. Additional shielding (as noted below) is used to protect sensitive, normal tissues.
Initial Radiation Treatment Planning (Will Render If Bill Simulation = Yes): The patient had a complete consultation regarding all applicable modalities for the treatment of their skin cancer and based on a variety of factors including the type of tumor, size, and location, the relevant medical history as well as local tissue factors, the functional status of the individual, the ability to perform necessary postoperative wound instructions and the need for simultaneous treatments as well as overall wound healing status, it was determined that the patient would begin radiation therapy treatment for skin cancer.  A full simulation and treatment device design was performed including the determination and formulation of appropriate simple and complex devices including lead shield of 0.762 mm thickness to form molded customized shielding to specifically correlate with the lesion size including treatment margin.  The custom lead shield is adequate to accommodate the appropriate applicator and provide adequate shielding around the treatment site.  The specific field applicator, shields, and devices both simple and complex as well as the specific patient setup is outlined below.  The patient was given a full consent for superficial radiation to both verbally and in writing and the full determination of patient's eligibility for treatment and selection is outlined on the patient eligibility and treatment selection form.  The specific superficial radiotherapy prescription was determined and was documented on the superficial radiotherapy prescription form.  A treatment calculation was also performed and documented on the treatment calculation form.  Based on the prescription, the patient was scheduled for a series of fractional treatments.
Simple Simulation Preamble Text Will Be Included With Simple Simulations (.......... Indications): Simple simulation was performed today for the following reasons:
Treatment Device Design After Initial Simulation Justification (Will Render If Bill For Treatment Devices = Yes): The patient is status post radiation simulation and is evaluated as to the use of additional devices for shielding and placement for radiation therapy.
Additional Prescription Justification Text: If there is any interruption in treatment exceeding 5 days please see Decay and Dose Adjustment Calculation and complete treatment under Prescription 2, 3 or 4 as appropriate.
Shielding Size (Optional- Include Units): 2.5 cm
Energy (Optional-Please Include Units): 100 kv
Depth (Optional-Please Include Units): 2.17 mm
Fractions / Week: 3
Time Dose Fractionation (Optional- Include Units If Applicable): 97
Daily Fractionated Dose (Optional- Include Units): 273 cGy
Treatment Time / Fractionation (Optional- Include Units): 0.42 min
Total Number Of Fractions: 20
Total Dose (Optional-Please Include Units): 5460 cGy
Fractions / Week Rx 2: 5
Total Number Of Fractions Rx 2: 15
Dose Per Fractionation In Cgy (Optional): 273
Day Of The Week Treatment Administered: Wednesday
Cumulative Dose In Cgy (Optional): 819
Assessment: Appropriate reaction
Intro Statement (Will Not Render If Left Blank): The patient is undergoing superficial radiation therapy for skin cancer and presents for weekly evaluation and management.  Per protocol and as documented on the flow sheet, the patient was questioned as to subjective redness, pruritus, pain, drainage, fatigue, or any other symptoms.  Objectively, the radiation area was evaluated with regards to erythema, atrophy, scale, crusting, erosion, ulceration, edema, purpura, tenderness, warmth, drainage, and any other findings.  The plan was extensively reviewed including the dose, and dosing schedule.  The simulation and clinical setup was also reviewed as was the external and any internal shields and based on this review the appropriateness and sufficiency of treatment was determined.
Information: Selecting Yes will display possible errors in your note based on the variables you have selected. This validation is only offered as a suggestion for you. PLEASE NOTE THAT THE VALIDATION TEXT WILL BE REMOVED WHEN YOU FINALIZE YOUR NOTE. IF YOU WANT TO FAX A PRELIMINARY NOTE YOU WILL NEED TO TOGGLE THIS TO 'NO' IF YOU DO NOT WANT IT IN YOUR FAXED NOTE.

## 2021-08-19 NOTE — PROCEDURE: SUPERFICIAL RADIATION TREATMENT
Simple Simulation Afterword Text Will Be Included With Simple Simulations (Indications............): The patient had a complete consultation regarding all applicable modalities for the treatment of their skin cancer and based on a variety of factors including the type of tumor, size, and location, the relevant medical history as well as local tissue factors, the functional status of the individual, the ability to perform necessary postoperative wound instructions and the need for simultaneous treatments as well as overall wound healing status, it was determined that the patient would begin radiation therapy treatment for skin cancer.  A full simulation and treatment device design was performed including the determination and formulation of appropriate simple and complex devices including lead shield of 0.762 mm thickness to form molded customized shielding to specifically correlate with the lesion size including treatment margin.  The custom lead shield is adequate to accommodate the appropriate applicator and provide adequate shielding around the treatment site.  The specific field applicator, shields, and devices both simple and complex as well as the specific patient setup is outlined below.  The patient was given a full consent for superficial radiation to both verbally and in writing and the full determination of patient's eligibility for treatment and selection is outlined on the patient eligibility and treatment selection form.  The specific superficial radiotherapy prescription was determined and was documented on the superficial radiotherapy prescription form.  A treatment calculation was also performed and documented on the treatment calculation form.  Based on the prescription, the patient was scheduled for a series of fractional treatments.
Depth (Optional-Please Include Units): 2.17 mm
Intro Statement (Will Not Render If Left Blank): The patient is undergoing superficial radiation therapy for skin cancer and presents for weekly evaluation and management.  Per protocol and as documented on the flow sheet, the patient was questioned as to subjective redness, pruritus, pain, drainage, fatigue, or any other symptoms.  Objectively, the radiation area was evaluated with regards to erythema, atrophy, scale, crusting, erosion, ulceration, edema, purpura, tenderness, warmth, drainage, and any other findings.  The plan was extensively reviewed including the dose, and dosing schedule.  The simulation and clinical setup was also reviewed as was the external and any internal shields and based on this review the appropriateness and sufficiency of treatment was determined.
Fractions / Week Rx 2: 5
Body Location Override (Optional): Nasal Supratip
Include Rx 2 When Rendering Additional Prescriptions: No
Dose / Tx In Cgy (Optional): 273
Port Dimensions-Y Axis In Cm: 2.5
Functional Status: 0 (fully active)
Time Dose Fractionation (Optional- Include Units If Applicable): 97
Bill For Dosimetry/Render Treatment Time Calculation In Note: Yes
Treatment Margins In Cm: 1
Custom Shielding Afterword Text Will Not Be Included With Simple Simulations (X X Y Cm............): port to correlate with the lesion size, including treatment margin. The custom lead shield is adequate to accommodate the appropriate applicator and provide adequate shielding around the treatment site. Additional shielding (as noted below) is used to protect sensitive, normal tissues.
Additional Prescription Justification Text: If there is any interruption in treatment exceeding 5 days please see Decay and Dose Adjustment Calculation and complete treatment under Prescription 2, 3 or 4 as appropriate.
Treatment Time / Fractionation (Optional- Include Units): 0.42 min
Information: Selecting Yes will display possible errors in your note based on the variables you have selected. This validation is only offered as a suggestion for you. PLEASE NOTE THAT THE VALIDATION TEXT WILL BE REMOVED WHEN YOU FINALIZE YOUR NOTE. IF YOU WANT TO FAX A PRELIMINARY NOTE YOU WILL NEED TO TOGGLE THIS TO 'NO' IF YOU DO NOT WANT IT IN YOUR FAXED NOTE.
Energy (Optional-Please Include Units): 100 kv
Total Number Of Fractions Rx 2: 15
Assessment: Appropriate reaction
Simple Simulation Preamble Text Will Be Included With Simple Simulations (.......... Indications): Simple simulation was performed today for the following reasons:
Patient Positioning: Sitting
Total Dose (Optional-Please Include Units): 5460 cGy
Fractions / Week: 3
Please Choose The Type Of Visit (Required): Treatment Visit: Show Treatment Variables
Dimensions-Y Axis In Cm: 1.5
Custom Shielding Preamble Text Will Not Be Included With Simple Simulations (.......... X X Y Cm): A lead shield of 0.762 mm thickness is utilized to form a molded, custom shield with a
Treatment Device Design After Initial Simulation Justification (Will Render If Bill For Treatment Devices = Yes): The patient is status post radiation simulation and is evaluated as to the use of additional devices for shielding and placement for radiation therapy.
Detail Level: Zone
Pathology Override (Pathology Will Render As Diagnosis Name If Left Blank): Basal Supratip
Cumulative Dose In Cgy (Optional): 819
Field Size (Applicator): 3.0 cm
Shielding Size (Optional- Include Units): 2.5 cm
Daily Fractionated Dose (Optional- Include Units): 273 cGy
Total Number Of Fractions: 20
Day Of The Week Treatment Administered: Wednesday
Dimensions-X Axis In Cm: 1.3

## 2021-08-23 ENCOUNTER — APPOINTMENT (RX ONLY)
Dept: URBAN - METROPOLITAN AREA CLINIC 37 | Facility: CLINIC | Age: 86
Setting detail: DERMATOLOGY
End: 2021-08-23

## 2021-08-23 ENCOUNTER — APPOINTMENT (RX ONLY)
Dept: URBAN - METROPOLITAN AREA CLINIC 38 | Facility: CLINIC | Age: 86
Setting detail: DERMATOLOGY
End: 2021-08-23

## 2021-08-23 PROBLEM — C44.311 BASAL CELL CARCINOMA OF SKIN OF NOSE: Status: ACTIVE | Noted: 2021-08-23

## 2021-08-23 PROCEDURE — G6001 ECHO GUIDANCE RADIOTHERAPY: HCPCS

## 2021-08-23 PROCEDURE — ? SUPERFICIAL RADIATION TREATMENT

## 2021-08-23 PROCEDURE — 77280 THER RAD SIMULAJ FIELD SMPL: CPT

## 2021-08-23 PROCEDURE — ? TREATMENT REGIMEN

## 2021-08-23 PROCEDURE — 77401: CPT

## 2021-08-23 NOTE — PROCEDURE: TREATMENT REGIMEN
Plan: This patient has been treated today with image guided superficial radiation therapy for non-melanoma\\nskin cancer.\\nWritten informed consent has been previously obtained from this patient for this treatment. This\\nconsent is documented in the patient’s chart. The patient gave verbal consent to continue treatment\\ntoday.\\nThe patient was treated with a specific radiation dose and setup as prescribed by the provider listed on\\nthis visit note. A Radiation Therapist performed administration of radiation under supervision of\\nprovider. The treatment parameters and cumulative dose are indicated above.\\nPrior to administering the radiation, the patient underwent a verification therapeutic radiology\\nsimulation-aided field setting defining relevant normal and abnormal target anatomy and acquiring\\nimages with high frequency ultrasound in addition to data necessary developing optimal radiation\\ntreatment process for the patient. This process includes verification of the treatment port(s) and proper\\ntreatment positioning. All treatment ports were photographed within electronic medical record. The\\npatient’s customized lead blocking along with gross tumor volume and margin was\\nconfirmed. Considering superficial radiotherapy is clinical in setup, this requires physician and\\nradiation therapist to clarify location interest being treated against initial images, pathology and\\npatient anatomy. Care was taken ensuring fields treated were geometrically accurate and properly\\npositioned using therapeutic radiology simulation-aided field setting verification per fraction. This\\nprocess is also utilized to determine if any prescription or setup changes are necessary. These steps are\\ntherefore medically necessary ensuring safe and effective administration of radiation. Ongoing\\ntherapeutic radiology simulation-aided field setting verification is ordered throughout course of\\ntherapy.\\nA high frequency ultrasound image was acquired today for two-dimensional evaluation of the tumor\\nvolume and response to treatment, in addition to geometric accuracy of field placement. US depth Is\\n________mm, which is ________mm in difference from previous imaging. The field placement and\\nultrasound imaging, per fraction, is separate and distinct from the initial simulation, and is an\\nimportant task in providing safe administration of superficial radiation therapy. Physician evaluation of\\nthe ultrasound tumor depth will be ongoing throughout the course of treatment, and is deemed\\nmedically necessary in order to ensure the efficacy of treatment and any necessary changes. Today’s\\nimage was evaluated for determination of continuation of treatment with the current plan or with\\nnecessary changes as appropriate. According to provider review of verification therapeutic radiology\\nsimulation-aided field setting and imaging, no change is required. Additionally, the use of ultrasound visualization \\nand targeted assessment allows the patient to be able to see their cancer(s) progress, encouraging patient to\\ncomplete and maintain compliance through full course of radiotherapy.\\nPer Dr. Sheriff, continued ultrasound guidance and therapeutic radiology simulation-aided field setting\\nverification per fraction is required for field placement, measurement of tumor depth, progress and\\nacute effect monitoring.
Detail Level: Zone

## 2021-08-23 NOTE — PROCEDURE: TREATMENT REGIMEN
Detail Level: Zone
Plan: This patient has been treated today with image guided superficial radiation therapy for non-melanoma\\nskin cancer.\\nWritten informed consent has been previously obtained from this patient for this treatment. This\\nconsent is documented in the patient’s chart. The patient gave verbal consent to continue treatment\\ntoday.\\nThe patient was treated with a specific radiation dose and setup as prescribed by the provider listed on\\nthis visit note. A Radiation Therapist performed administration of radiation under supervision of\\nprovider. The treatment parameters and cumulative dose are indicated above.\\nPrior to administering the radiation, the patient underwent a verification therapeutic radiology\\nsimulation-aided field setting defining relevant normal and abnormal target anatomy and acquiring\\nimages with high frequency ultrasound in addition to data necessary developing optimal radiation\\ntreatment process for the patient. This process includes verification of the treatment port(s) and proper\\ntreatment positioning. All treatment ports were photographed within electronic medical record. The\\npatient’s customized lead blocking along with gross tumor volume and margin was\\nconfirmed. Considering superficial radiotherapy is clinical in setup, this requires physician and\\nradiation therapist to clarify location interest being treated against initial images, pathology and\\npatient anatomy. Care was taken ensuring fields treated were geometrically accurate and properly\\npositioned using therapeutic radiology simulation-aided field setting verification per fraction. This\\nprocess is also utilized to determine if any prescription or setup changes are necessary. These steps are\\ntherefore medically necessary ensuring safe and effective administration of radiation. Ongoing\\ntherapeutic radiology simulation-aided field setting verification is ordered throughout course of\\ntherapy.\\nA high frequency ultrasound image was acquired today for two-dimensional evaluation of the tumor\\nvolume and response to treatment, in addition to geometric accuracy of field placement. US depth Is\\n________mm, which is ________mm in difference from previous imaging. The field placement and\\nultrasound imaging, per fraction, is separate and distinct from the initial simulation, and is an\\nimportant task in providing safe administration of superficial radiation therapy. Physician evaluation of\\nthe ultrasound tumor depth will be ongoing throughout the course of treatment, and is deemed\\nmedically necessary in order to ensure the efficacy of treatment and any necessary changes. Today’s\\nimage was evaluated for determination of continuation of treatment with the current plan or with\\nnecessary changes as appropriate. According to provider review of verification therapeutic radiology\\nsimulation-aided field setting and imaging, no change is required. Additionally, the use of ultrasound visualization \\nand targeted assessment allows the patient to be able to see their cancer(s) progress, encouraging patient to\\ncomplete and maintain compliance through full course of radiotherapy.\\nPer Dr. Sousa, continued ultrasound guidance and therapeutic radiology simulation-aided field setting\\nverification per fraction is required for field placement, measurement of tumor depth, progress and\\nacute effect monitoring.

## 2021-08-23 NOTE — PROCEDURE: SUPERFICIAL RADIATION TREATMENT
Include Rx 2 When Rendering Additional Prescriptions: No
Custom Shielding Preamble Text Will Not Be Included With Simple Simulations (.......... X X Y Cm): A lead shield of 0.762 mm thickness is utilized to form a molded, custom shield with a
Field Size (Applicator): 3.0 cm
Patient Positioning: Sitting
Functional Status: 0 (fully active)
Depth (Optional-Please Include Units): 2.17 mm
Number Of Treatment Days: 1
Initial Radiation Treatment Planning (Will Render If Bill Simulation = Yes): The patient had a complete consultation regarding all applicable modalities for the treatment of their skin cancer and based on a variety of factors including the type of tumor, size, and location, the relevant medical history as well as local tissue factors, the functional status of the individual, the ability to perform necessary postoperative wound instructions and the need for simultaneous treatments as well as overall wound healing status, it was determined that the patient would begin radiation therapy treatment for skin cancer.  A full simulation and treatment device design was performed including the determination and formulation of appropriate simple and complex devices including lead shield of 0.762 mm thickness to form molded customized shielding to specifically correlate with the lesion size including treatment margin.  The custom lead shield is adequate to accommodate the appropriate applicator and provide adequate shielding around the treatment site.  The specific field applicator, shields, and devices both simple and complex as well as the specific patient setup is outlined below.  The patient was given a full consent for superficial radiation to both verbally and in writing and the full determination of patient's eligibility for treatment and selection is outlined on the patient eligibility and treatment selection form.  The specific superficial radiotherapy prescription was determined and was documented on the superficial radiotherapy prescription form.  A treatment calculation was also performed and documented on the treatment calculation form.  Based on the prescription, the patient was scheduled for a series of fractional treatments.
Additional Prescription Justification Text: If there is any interruption in treatment exceeding 5 days please see Decay and Dose Adjustment Calculation and complete treatment under Prescription 2, 3 or 4 as appropriate.
Fractionation Number (Evaluation): 5
Validate Note Data (See Information Below): Yes
Assessment: Appropriate reaction
Information: Selecting Yes will display possible errors in your note based on the variables you have selected. This validation is only offered as a suggestion for you. PLEASE NOTE THAT THE VALIDATION TEXT WILL BE REMOVED WHEN YOU FINALIZE YOUR NOTE. IF YOU WANT TO FAX A PRELIMINARY NOTE YOU WILL NEED TO TOGGLE THIS TO 'NO' IF YOU DO NOT WANT IT IN YOUR FAXED NOTE.
Dimensions-Y Axis In Cm: 1.5
Treatment Time / Fractionation (Optional- Include Units): 0.42 min
Time Dose Fractionation (Optional- Include Units If Applicable): 97
Total Dose (Optional-Please Include Units): 5460 cGy
Body Location Override (Optional): Nasal Supratip
Dose Per Fractionation In Cgy (Optional): 273
Port Dimensions-Y Axis In Cm: 2.5
Custom Shielding Afterword Text Will Not Be Included With Simple Simulations (X X Y Cm............): port to correlate with the lesion size, including treatment margin. The custom lead shield is adequate to accommodate the appropriate applicator and provide adequate shielding around the treatment site. Additional shielding (as noted below) is used to protect sensitive, normal tissues.
Energy (Optional-Please Include Units): 100 kv
Cumulative Dose In Cgy (Optional): 1092
Intro Statement (Will Not Render If Left Blank): The patient is undergoing superficial radiation therapy for skin cancer and presents for weekly evaluation and management.  Per protocol and as documented on the flow sheet, the patient was questioned as to subjective redness, pruritus, pain, drainage, fatigue, or any other symptoms.  Objectively, the radiation area was evaluated with regards to erythema, atrophy, scale, crusting, erosion, ulceration, edema, purpura, tenderness, warmth, drainage, and any other findings.  The plan was extensively reviewed including the dose, and dosing schedule.  The simulation and clinical setup was also reviewed as was the external and any internal shields and based on this review the appropriateness and sufficiency of treatment was determined.
Daily Fractionated Dose (Optional- Include Units): 273 cGy
Pathology Override (Pathology Will Render As Diagnosis Name If Left Blank): Basal Supratip
Fractions / Week: 3
Simple Simulation Preamble Text Will Be Included With Simple Simulations (.......... Indications): Simple simulation was performed today for the following reasons:
Total Number Of Fractions Rx 4: 15
Total Number Of Fractions: 20
Treatment Device Design After Initial Simulation Justification (Will Render If Bill For Treatment Devices = Yes): The patient is status post radiation simulation and is evaluated as to the use of additional devices for shielding and placement for radiation therapy.
Shielding Size (Optional- Include Units): 2.5 cm
Detail Level: Zone
Dimensions-X Axis In Cm: 1.3
Day Of The Week Treatment Administered: Monday
Please Choose The Type Of Visit (Required): Treatment Visit: Show Treatment Variables
Fractionation Number: 4

## 2021-08-23 NOTE — PROCEDURE: SUPERFICIAL RADIATION TREATMENT
Body Location Override (Optional): Nasal Supratip
Detail Level: Zone
Pathology Override (Pathology Will Render As Diagnosis Name If Left Blank): Basal Supratip
Field Number: 1
Dimensions-X Axis In Cm: 1.3
Dimensions-Y Axis In Cm: 1.5
Render Patient Eligibility And Selection In Note?: Yes
Render Additional Prescriptions In Note?: No
Please Choose The Type Of Visit (Required): Treatment Visit: Show Treatment Variables
Functional Status: 0 (fully active)
Port Dimensions-X Axis In Cm: 2.5
Field Size (Applicator): 3.0 cm
Patient Positioning: Sitting
Custom Shielding Preamble Text Will Not Be Included With Simple Simulations (.......... X X Y Cm): A lead shield of 0.762 mm thickness is utilized to form a molded, custom shield with a
Custom Shielding Afterword Text Will Not Be Included With Simple Simulations (X X Y Cm............): port to correlate with the lesion size, including treatment margin. The custom lead shield is adequate to accommodate the appropriate applicator and provide adequate shielding around the treatment site. Additional shielding (as noted below) is used to protect sensitive, normal tissues.
Initial Radiation Treatment Planning (Will Render If Bill Simulation = Yes): The patient had a complete consultation regarding all applicable modalities for the treatment of their skin cancer and based on a variety of factors including the type of tumor, size, and location, the relevant medical history as well as local tissue factors, the functional status of the individual, the ability to perform necessary postoperative wound instructions and the need for simultaneous treatments as well as overall wound healing status, it was determined that the patient would begin radiation therapy treatment for skin cancer.  A full simulation and treatment device design was performed including the determination and formulation of appropriate simple and complex devices including lead shield of 0.762 mm thickness to form molded customized shielding to specifically correlate with the lesion size including treatment margin.  The custom lead shield is adequate to accommodate the appropriate applicator and provide adequate shielding around the treatment site.  The specific field applicator, shields, and devices both simple and complex as well as the specific patient setup is outlined below.  The patient was given a full consent for superficial radiation to both verbally and in writing and the full determination of patient's eligibility for treatment and selection is outlined on the patient eligibility and treatment selection form.  The specific superficial radiotherapy prescription was determined and was documented on the superficial radiotherapy prescription form.  A treatment calculation was also performed and documented on the treatment calculation form.  Based on the prescription, the patient was scheduled for a series of fractional treatments.
Simple Simulation Preamble Text Will Be Included With Simple Simulations (.......... Indications): Simple simulation was performed today for the following reasons:
Treatment Device Design After Initial Simulation Justification (Will Render If Bill For Treatment Devices = Yes): The patient is status post radiation simulation and is evaluated as to the use of additional devices for shielding and placement for radiation therapy.
Additional Prescription Justification Text: If there is any interruption in treatment exceeding 5 days please see Decay and Dose Adjustment Calculation and complete treatment under Prescription 2, 3 or 4 as appropriate.
Shielding Size (Optional- Include Units): 2.5 cm
Energy (Optional-Please Include Units): 100 kv
Depth (Optional-Please Include Units): 2.17 mm
Fractions / Week: 3
Time Dose Fractionation (Optional- Include Units If Applicable): 97
Daily Fractionated Dose (Optional- Include Units): 273 cGy
Treatment Time / Fractionation (Optional- Include Units): 0.42 min
Total Number Of Fractions: 20
Total Dose (Optional-Please Include Units): 5460 cGy
Fractions / Week Rx 2: 5
Total Number Of Fractions Rx 2: 15
Dose Per Fractionation In Cgy (Optional): 273
Day Of The Week Treatment Administered: Monday
Fractionation Number: 4
Cumulative Dose In Cgy (Optional): 1092
Assessment: Appropriate reaction
Intro Statement (Will Not Render If Left Blank): The patient is undergoing superficial radiation therapy for skin cancer and presents for weekly evaluation and management.  Per protocol and as documented on the flow sheet, the patient was questioned as to subjective redness, pruritus, pain, drainage, fatigue, or any other symptoms.  Objectively, the radiation area was evaluated with regards to erythema, atrophy, scale, crusting, erosion, ulceration, edema, purpura, tenderness, warmth, drainage, and any other findings.  The plan was extensively reviewed including the dose, and dosing schedule.  The simulation and clinical setup was also reviewed as was the external and any internal shields and based on this review the appropriateness and sufficiency of treatment was determined.
Information: Selecting Yes will display possible errors in your note based on the variables you have selected. This validation is only offered as a suggestion for you. PLEASE NOTE THAT THE VALIDATION TEXT WILL BE REMOVED WHEN YOU FINALIZE YOUR NOTE. IF YOU WANT TO FAX A PRELIMINARY NOTE YOU WILL NEED TO TOGGLE THIS TO 'NO' IF YOU DO NOT WANT IT IN YOUR FAXED NOTE.

## 2021-08-24 ENCOUNTER — APPOINTMENT (RX ONLY)
Dept: URBAN - METROPOLITAN AREA CLINIC 37 | Facility: CLINIC | Age: 86
Setting detail: DERMATOLOGY
End: 2021-08-24

## 2021-08-24 ENCOUNTER — APPOINTMENT (RX ONLY)
Dept: URBAN - METROPOLITAN AREA CLINIC 38 | Facility: CLINIC | Age: 86
Setting detail: DERMATOLOGY
End: 2021-08-24

## 2021-08-24 PROBLEM — C44.311 BASAL CELL CARCINOMA OF SKIN OF NOSE: Status: ACTIVE | Noted: 2021-08-24

## 2021-08-24 PROCEDURE — 77280 THER RAD SIMULAJ FIELD SMPL: CPT

## 2021-08-24 PROCEDURE — ? SUPERFICIAL RADIATION TREATMENT

## 2021-08-24 PROCEDURE — ? TREATMENT REGIMEN

## 2021-08-24 PROCEDURE — G6001 ECHO GUIDANCE RADIOTHERAPY: HCPCS

## 2021-08-24 PROCEDURE — 77401: CPT

## 2021-08-24 NOTE — PROCEDURE: SUPERFICIAL RADIATION TREATMENT
Body Location Override (Optional): Nasal Supratip
Detail Level: Zone
Pathology Override (Pathology Will Render As Diagnosis Name If Left Blank): Basal Supratip
Field Number: 1
Dimensions-X Axis In Cm: 1.3
Dimensions-Y Axis In Cm: 1.5
Render Patient Eligibility And Selection In Note?: Yes
Render Additional Prescriptions In Note?: No
Please Choose The Type Of Visit (Required): Treatment Visit: Show Treatment Variables
Functional Status: 0 (fully active)
Port Dimensions-X Axis In Cm: 2.5
Field Size (Applicator): 3.0 cm
Patient Positioning: Sitting
Custom Shielding Preamble Text Will Not Be Included With Simple Simulations (.......... X X Y Cm): A lead shield of 0.762 mm thickness is utilized to form a molded, custom shield with a
Custom Shielding Afterword Text Will Not Be Included With Simple Simulations (X X Y Cm............): port to correlate with the lesion size, including treatment margin. The custom lead shield is adequate to accommodate the appropriate applicator and provide adequate shielding around the treatment site. Additional shielding (as noted below) is used to protect sensitive, normal tissues.
Initial Radiation Treatment Planning (Will Render If Bill Simulation = Yes): The patient had a complete consultation regarding all applicable modalities for the treatment of their skin cancer and based on a variety of factors including the type of tumor, size, and location, the relevant medical history as well as local tissue factors, the functional status of the individual, the ability to perform necessary postoperative wound instructions and the need for simultaneous treatments as well as overall wound healing status, it was determined that the patient would begin radiation therapy treatment for skin cancer.  A full simulation and treatment device design was performed including the determination and formulation of appropriate simple and complex devices including lead shield of 0.762 mm thickness to form molded customized shielding to specifically correlate with the lesion size including treatment margin.  The custom lead shield is adequate to accommodate the appropriate applicator and provide adequate shielding around the treatment site.  The specific field applicator, shields, and devices both simple and complex as well as the specific patient setup is outlined below.  The patient was given a full consent for superficial radiation to both verbally and in writing and the full determination of patient's eligibility for treatment and selection is outlined on the patient eligibility and treatment selection form.  The specific superficial radiotherapy prescription was determined and was documented on the superficial radiotherapy prescription form.  A treatment calculation was also performed and documented on the treatment calculation form.  Based on the prescription, the patient was scheduled for a series of fractional treatments.
Simple Simulation Preamble Text Will Be Included With Simple Simulations (.......... Indications): Simple simulation was performed today for the following reasons:
Treatment Device Design After Initial Simulation Justification (Will Render If Bill For Treatment Devices = Yes): The patient is status post radiation simulation and is evaluated as to the use of additional devices for shielding and placement for radiation therapy.
Additional Prescription Justification Text: If there is any interruption in treatment exceeding 5 days please see Decay and Dose Adjustment Calculation and complete treatment under Prescription 2, 3 or 4 as appropriate.
Shielding Size (Optional- Include Units): 2.5 cm
Energy (Optional-Please Include Units): 100 kv
Depth (Optional-Please Include Units): 2.17 mm
Fractions / Week: 3
Time Dose Fractionation (Optional- Include Units If Applicable): 97
Daily Fractionated Dose (Optional- Include Units): 273 cGy
Treatment Time / Fractionation (Optional- Include Units): 0.42 min
Total Number Of Fractions: 20
Total Dose (Optional-Please Include Units): 5460 cGy
Fractions / Week Rx 2: 5
Total Number Of Fractions Rx 2: 15
Dose Per Fractionation In Cgy (Optional): 273
Day Of The Week Treatment Administered: Tuesday
Cumulative Dose In Cgy (Optional): 1365
Assessment: Appropriate reaction
Intro Statement (Will Not Render If Left Blank): The patient is undergoing superficial radiation therapy for skin cancer and presents for weekly evaluation and management.  Per protocol and as documented on the flow sheet, the patient was questioned as to subjective redness, pruritus, pain, drainage, fatigue, or any other symptoms.  Objectively, the radiation area was evaluated with regards to erythema, atrophy, scale, crusting, erosion, ulceration, edema, purpura, tenderness, warmth, drainage, and any other findings.  The plan was extensively reviewed including the dose, and dosing schedule.  The simulation and clinical setup was also reviewed as was the external and any internal shields and based on this review the appropriateness and sufficiency of treatment was determined.
Information: Selecting Yes will display possible errors in your note based on the variables you have selected. This validation is only offered as a suggestion for you. PLEASE NOTE THAT THE VALIDATION TEXT WILL BE REMOVED WHEN YOU FINALIZE YOUR NOTE. IF YOU WANT TO FAX A PRELIMINARY NOTE YOU WILL NEED TO TOGGLE THIS TO 'NO' IF YOU DO NOT WANT IT IN YOUR FAXED NOTE.

## 2021-08-24 NOTE — PROCEDURE: SUPERFICIAL RADIATION TREATMENT
Please Choose The Type Of Visit (Required): Treatment Visit: Show Treatment Variables
Total Number Of Fractions Rx 3: 15
Pathology Override (Pathology Will Render As Diagnosis Name If Left Blank): Basal Supratip
Energy (Optional-Please Include Units): 100 kv
Number Of Treatment Days: 1
Patient Positioning: Sitting
Render Patient Eligibility And Selection In Note?: Yes
Treatment Time / Fractionation (Optional- Include Units): 0.42 min
Dose / Tx In Cgy (Optional): 273
Field Size (Applicator): 3.0 cm
Render Additional Prescriptions In Note?: No
Dimensions-X Axis In Cm: 1.3
Simple Simulation Preamble Text Will Be Included With Simple Simulations (.......... Indications): Simple simulation was performed today for the following reasons:
Fractions / Week: 3
Total Dose (Optional-Please Include Units): 5460 cGy
Shielding Size (Optional- Include Units): 2.5 cm
Custom Shielding Preamble Text Will Not Be Included With Simple Simulations (.......... X X Y Cm): A lead shield of 0.762 mm thickness is utilized to form a molded, custom shield with a
Intro Statement (Will Not Render If Left Blank): The patient is undergoing superficial radiation therapy for skin cancer and presents for weekly evaluation and management.  Per protocol and as documented on the flow sheet, the patient was questioned as to subjective redness, pruritus, pain, drainage, fatigue, or any other symptoms.  Objectively, the radiation area was evaluated with regards to erythema, atrophy, scale, crusting, erosion, ulceration, edema, purpura, tenderness, warmth, drainage, and any other findings.  The plan was extensively reviewed including the dose, and dosing schedule.  The simulation and clinical setup was also reviewed as was the external and any internal shields and based on this review the appropriateness and sufficiency of treatment was determined.
Fractionation Number (Evaluation): 5
Body Location Override (Optional): Nasal Supratip
Assessment: Appropriate reaction
Information: Selecting Yes will display possible errors in your note based on the variables you have selected. This validation is only offered as a suggestion for you. PLEASE NOTE THAT THE VALIDATION TEXT WILL BE REMOVED WHEN YOU FINALIZE YOUR NOTE. IF YOU WANT TO FAX A PRELIMINARY NOTE YOU WILL NEED TO TOGGLE THIS TO 'NO' IF YOU DO NOT WANT IT IN YOUR FAXED NOTE.
Daily Fractionated Dose (Optional- Include Units): 273 cGy
Treatment Device Design After Initial Simulation Justification (Will Render If Bill For Treatment Devices = Yes): The patient is status post radiation simulation and is evaluated as to the use of additional devices for shielding and placement for radiation therapy.
Port Dimensions-Y Axis In Cm: 2.5
Initial Radiation Treatment Planning (Will Render If Bill Simulation = Yes): The patient had a complete consultation regarding all applicable modalities for the treatment of their skin cancer and based on a variety of factors including the type of tumor, size, and location, the relevant medical history as well as local tissue factors, the functional status of the individual, the ability to perform necessary postoperative wound instructions and the need for simultaneous treatments as well as overall wound healing status, it was determined that the patient would begin radiation therapy treatment for skin cancer.  A full simulation and treatment device design was performed including the determination and formulation of appropriate simple and complex devices including lead shield of 0.762 mm thickness to form molded customized shielding to specifically correlate with the lesion size including treatment margin.  The custom lead shield is adequate to accommodate the appropriate applicator and provide adequate shielding around the treatment site.  The specific field applicator, shields, and devices both simple and complex as well as the specific patient setup is outlined below.  The patient was given a full consent for superficial radiation to both verbally and in writing and the full determination of patient's eligibility for treatment and selection is outlined on the patient eligibility and treatment selection form.  The specific superficial radiotherapy prescription was determined and was documented on the superficial radiotherapy prescription form.  A treatment calculation was also performed and documented on the treatment calculation form.  Based on the prescription, the patient was scheduled for a series of fractional treatments.
Depth (Optional-Please Include Units): 2.17 mm
Total Number Of Fractions: 20
Day Of The Week Treatment Administered: Tuesday
Cumulative Dose In Cgy (Optional): 1365
Functional Status: 0 (fully active)
Dimensions-Y Axis In Cm: 1.5
Time Dose Fractionation (Optional- Include Units If Applicable): 97
Additional Prescription Justification Text: If there is any interruption in treatment exceeding 5 days please see Decay and Dose Adjustment Calculation and complete treatment under Prescription 2, 3 or 4 as appropriate.
Custom Shielding Afterword Text Will Not Be Included With Simple Simulations (X X Y Cm............): port to correlate with the lesion size, including treatment margin. The custom lead shield is adequate to accommodate the appropriate applicator and provide adequate shielding around the treatment site. Additional shielding (as noted below) is used to protect sensitive, normal tissues.
Detail Level: Zone

## 2021-08-27 ENCOUNTER — APPOINTMENT (RX ONLY)
Dept: URBAN - METROPOLITAN AREA CLINIC 38 | Facility: CLINIC | Age: 86
Setting detail: DERMATOLOGY
End: 2021-08-27

## 2021-08-27 ENCOUNTER — APPOINTMENT (RX ONLY)
Dept: URBAN - METROPOLITAN AREA CLINIC 37 | Facility: CLINIC | Age: 86
Setting detail: DERMATOLOGY
End: 2021-08-27

## 2021-08-27 PROBLEM — C44.311 BASAL CELL CARCINOMA OF SKIN OF NOSE: Status: ACTIVE | Noted: 2021-08-27

## 2021-08-27 PROCEDURE — 77280 THER RAD SIMULAJ FIELD SMPL: CPT

## 2021-08-27 PROCEDURE — ? TREATMENT REGIMEN

## 2021-08-27 PROCEDURE — G6001 ECHO GUIDANCE RADIOTHERAPY: HCPCS

## 2021-08-27 PROCEDURE — ? SUPERFICIAL RADIATION TREATMENT

## 2021-08-27 PROCEDURE — 77427 RADIATION TX MANAGEMENT X5: CPT

## 2021-08-27 PROCEDURE — 77401: CPT

## 2021-08-27 NOTE — PROCEDURE: SUPERFICIAL RADIATION TREATMENT
Body Location Override (Optional): Nasal Supratip
Detail Level: Zone
Pathology Override (Pathology Will Render As Diagnosis Name If Left Blank): Basal Supratip
Field Number: 1
Dimensions-X Axis In Cm: 1.3
Dimensions-Y Axis In Cm: 1.5
Render Patient Eligibility And Selection In Note?: Yes
Render Additional Prescriptions In Note?: No
Please Choose The Type Of Visit (Required): Weekly Evaluation Visit: Show Weekly Evaluation Variables
Functional Status: 0 (fully active)
Port Dimensions-X Axis In Cm: 2.5
Field Size (Applicator): 3.0 cm
Patient Positioning: Sitting
Custom Shielding Preamble Text Will Not Be Included With Simple Simulations (.......... X X Y Cm): A lead shield of 0.762 mm thickness is utilized to form a molded, custom shield with a
Custom Shielding Afterword Text Will Not Be Included With Simple Simulations (X X Y Cm............): port to correlate with the lesion size, including treatment margin. The custom lead shield is adequate to accommodate the appropriate applicator and provide adequate shielding around the treatment site. Additional shielding (as noted below) is used to protect sensitive, normal tissues.
Initial Radiation Treatment Planning (Will Render If Bill Simulation = Yes): The patient had a complete consultation regarding all applicable modalities for the treatment of their skin cancer and based on a variety of factors including the type of tumor, size, and location, the relevant medical history as well as local tissue factors, the functional status of the individual, the ability to perform necessary postoperative wound instructions and the need for simultaneous treatments as well as overall wound healing status, it was determined that the patient would begin radiation therapy treatment for skin cancer.  A full simulation and treatment device design was performed including the determination and formulation of appropriate simple and complex devices including lead shield of 0.762 mm thickness to form molded customized shielding to specifically correlate with the lesion size including treatment margin.  The custom lead shield is adequate to accommodate the appropriate applicator and provide adequate shielding around the treatment site.  The specific field applicator, shields, and devices both simple and complex as well as the specific patient setup is outlined below.  The patient was given a full consent for superficial radiation to both verbally and in writing and the full determination of patient's eligibility for treatment and selection is outlined on the patient eligibility and treatment selection form.  The specific superficial radiotherapy prescription was determined and was documented on the superficial radiotherapy prescription form.  A treatment calculation was also performed and documented on the treatment calculation form.  Based on the prescription, the patient was scheduled for a series of fractional treatments.
Simple Simulation Preamble Text Will Be Included With Simple Simulations (.......... Indications): Simple simulation was performed today for the following reasons:
Treatment Device Design After Initial Simulation Justification (Will Render If Bill For Treatment Devices = Yes): The patient is status post radiation simulation and is evaluated as to the use of additional devices for shielding and placement for radiation therapy.
Additional Prescription Justification Text: If there is any interruption in treatment exceeding 5 days please see Decay and Dose Adjustment Calculation and complete treatment under Prescription 2, 3 or 4 as appropriate.
Shielding Size (Optional- Include Units): 2.5 cm
Energy (Optional-Please Include Units): 100 kv
Depth (Optional-Please Include Units): 2.17 mm
Fractions / Week: 3
Time Dose Fractionation (Optional- Include Units If Applicable): 97
Daily Fractionated Dose (Optional- Include Units): 273 cGy
Treatment Time / Fractionation (Optional- Include Units): 0.42 min
Total Number Of Fractions: 20
Total Dose (Optional-Please Include Units): 5460 cGy
Fractions / Week Rx 2: 5
Total Number Of Fractions Rx 2: 15
Dose Per Fractionation In Cgy (Optional): 273
Day Of The Week Treatment Administered: Thursday
Fractionation Number: 6
Cumulative Dose In Cgy (Optional): 1434
Assessment: Appropriate reaction
Intro Statement (Will Not Render If Left Blank): The patient is undergoing superficial radiation therapy for skin cancer and presents for weekly evaluation and management.  Per protocol and as documented on the flow sheet, the patient was questioned as to subjective redness, pruritus, pain, drainage, fatigue, or any other symptoms.  Objectively, the radiation area was evaluated with regards to erythema, atrophy, scale, crusting, erosion, ulceration, edema, purpura, tenderness, warmth, drainage, and any other findings.  The plan was extensively reviewed including the dose, and dosing schedule.  The simulation and clinical setup was also reviewed as was the external and any internal shields and based on this review the appropriateness and sufficiency of treatment was determined.
Information: Selecting Yes will display possible errors in your note based on the variables you have selected. This validation is only offered as a suggestion for you. PLEASE NOTE THAT THE VALIDATION TEXT WILL BE REMOVED WHEN YOU FINALIZE YOUR NOTE. IF YOU WANT TO FAX A PRELIMINARY NOTE YOU WILL NEED TO TOGGLE THIS TO 'NO' IF YOU DO NOT WANT IT IN YOUR FAXED NOTE.

## 2021-08-27 NOTE — PROCEDURE: TREATMENT REGIMEN
No
Detail Level: Zone
Plan: This patient has been treated today with image guided superficial radiation therapy for non-melanoma\\nskin cancer.\\nWritten informed consent has been previously obtained from this patient for this treatment. This\\nconsent is documented in the patient’s chart. The patient gave verbal consent to continue treatment\\ntoday.\\nThe patient was treated with a specific radiation dose and setup as prescribed by the provider listed on\\nthis visit note. A Radiation Therapist performed administration of radiation under supervision of\\nprovider. The treatment parameters and cumulative dose are indicated above.\\nPrior to administering the radiation, the patient underwent a verification therapeutic radiology\\nsimulation-aided field setting defining relevant normal and abnormal target anatomy and acquiring\\nimages with high frequency ultrasound in addition to data necessary developing optimal radiation\\ntreatment process for the patient. This process includes verification of the treatment port(s) and proper\\ntreatment positioning. All treatment ports were photographed within electronic medical record. The\\npatient’s customized lead blocking along with gross tumor volume and margin was\\nconfirmed. Considering superficial radiotherapy is clinical in setup, this requires physician and\\nradiation therapist to clarify location interest being treated against initial images, pathology and\\npatient anatomy. Care was taken ensuring fields treated were geometrically accurate and properly\\npositioned using therapeutic radiology simulation-aided field setting verification per fraction. This\\nprocess is also utilized to determine if any prescription or setup changes are necessary. These steps are\\ntherefore medically necessary ensuring safe and effective administration of radiation. Ongoing\\ntherapeutic radiology simulation-aided field setting verification is ordered throughout course of\\ntherapy.\\nA high frequency ultrasound image was acquired today for two-dimensional evaluation of the tumor\\nvolume and response to treatment, in addition to geometric accuracy of field placement. US depth Is\\n________mm, which is ________mm in difference from previous imaging. The field placement and\\nultrasound imaging, per fraction, is separate and distinct from the initial simulation, and is an\\nimportant task in providing safe administration of superficial radiation therapy. Physician evaluation of\\nthe ultrasound tumor depth will be ongoing throughout the course of treatment, and is deemed\\nmedically necessary in order to ensure the efficacy of treatment and any necessary changes. Today’s\\nimage was evaluated for determination of continuation of treatment with the current plan or with\\nnecessary changes as appropriate. According to provider review of verification therapeutic radiology\\nsimulation-aided field setting and imaging, no change is required. Additionally, the use of ultrasound visualization \\nand targeted assessment allows the patient to be able to see their cancer(s) progress, encouraging patient to\\ncomplete and maintain compliance through full course of radiotherapy.\\nPer Dr. Sousa, continued ultrasound guidance and therapeutic radiology simulation-aided field setting\\nverification per fraction is required for field placement, measurement of tumor depth, progress and\\nacute effect monitoring.

## 2021-08-27 NOTE — PROCEDURE: SUPERFICIAL RADIATION TREATMENT
Functional Status: 0 (fully active)
Treatment Margins In Cm: 1
Treatment Time / Fractionation (Optional- Include Units): 0.42 min
Initial Radiation Treatment Planning (Will Render If Bill Simulation = Yes): The patient had a complete consultation regarding all applicable modalities for the treatment of their skin cancer and based on a variety of factors including the type of tumor, size, and location, the relevant medical history as well as local tissue factors, the functional status of the individual, the ability to perform necessary postoperative wound instructions and the need for simultaneous treatments as well as overall wound healing status, it was determined that the patient would begin radiation therapy treatment for skin cancer.  A full simulation and treatment device design was performed including the determination and formulation of appropriate simple and complex devices including lead shield of 0.762 mm thickness to form molded customized shielding to specifically correlate with the lesion size including treatment margin.  The custom lead shield is adequate to accommodate the appropriate applicator and provide adequate shielding around the treatment site.  The specific field applicator, shields, and devices both simple and complex as well as the specific patient setup is outlined below.  The patient was given a full consent for superficial radiation to both verbally and in writing and the full determination of patient's eligibility for treatment and selection is outlined on the patient eligibility and treatment selection form.  The specific superficial radiotherapy prescription was determined and was documented on the superficial radiotherapy prescription form.  A treatment calculation was also performed and documented on the treatment calculation form.  Based on the prescription, the patient was scheduled for a series of fractional treatments.
Port Dimensions-Y Axis In Cm: 2.5
Intro Statement (Will Not Render If Left Blank): The patient is undergoing superficial radiation therapy for skin cancer and presents for weekly evaluation and management.  Per protocol and as documented on the flow sheet, the patient was questioned as to subjective redness, pruritus, pain, drainage, fatigue, or any other symptoms.  Objectively, the radiation area was evaluated with regards to erythema, atrophy, scale, crusting, erosion, ulceration, edema, purpura, tenderness, warmth, drainage, and any other findings.  The plan was extensively reviewed including the dose, and dosing schedule.  The simulation and clinical setup was also reviewed as was the external and any internal shields and based on this review the appropriateness and sufficiency of treatment was determined.
Include Rx 4 When Rendering Additional Prescriptions: No
Fractions / Week Rx 4: 5
Fractions / Week: 3
Total Dose (Optional-Please Include Units): 5460 cGy
Validate Note Data (See Information Below): Yes
Total Number Of Fractions Rx 2: 15
Shielding Size (Optional- Include Units): 2.5 cm
Field Size (Applicator): 3.0 cm
Information: Selecting Yes will display possible errors in your note based on the variables you have selected. This validation is only offered as a suggestion for you. PLEASE NOTE THAT THE VALIDATION TEXT WILL BE REMOVED WHEN YOU FINALIZE YOUR NOTE. IF YOU WANT TO FAX A PRELIMINARY NOTE YOU WILL NEED TO TOGGLE THIS TO 'NO' IF YOU DO NOT WANT IT IN YOUR FAXED NOTE.
Assessment: Appropriate reaction
Day Of The Week Treatment Administered: Thursday
Cumulative Dose In Cgy (Optional): 4287
Dimensions-Y Axis In Cm: 1.5
Daily Fractionated Dose (Optional- Include Units): 273 cGy
Fractionation Number: 6
Custom Shielding Afterword Text Will Not Be Included With Simple Simulations (X X Y Cm............): port to correlate with the lesion size, including treatment margin. The custom lead shield is adequate to accommodate the appropriate applicator and provide adequate shielding around the treatment site. Additional shielding (as noted below) is used to protect sensitive, normal tissues.
Depth (Optional-Please Include Units): 2.17 mm
Patient Positioning: Sitting
Dose Per Fractionation In Cgy (Optional): 273
Detail Level: Zone
Pathology Override (Pathology Will Render As Diagnosis Name If Left Blank): Basal Supratip
Total Number Of Fractions: 20
Please Choose The Type Of Visit (Required): Weekly Evaluation Visit: Show Weekly Evaluation Variables
Additional Prescription Justification Text: If there is any interruption in treatment exceeding 5 days please see Decay and Dose Adjustment Calculation and complete treatment under Prescription 2, 3 or 4 as appropriate.
Simple Simulation Preamble Text Will Be Included With Simple Simulations (.......... Indications): Simple simulation was performed today for the following reasons:
Dimensions-X Axis In Cm: 1.3
Time Dose Fractionation (Optional- Include Units If Applicable): 97
Energy (Optional-Please Include Units): 100 kv
Custom Shielding Preamble Text Will Not Be Included With Simple Simulations (.......... X X Y Cm): A lead shield of 0.762 mm thickness is utilized to form a molded, custom shield with a
Body Location Override (Optional): Nasal Supratip
Treatment Device Design After Initial Simulation Justification (Will Render If Bill For Treatment Devices = Yes): The patient is status post radiation simulation and is evaluated as to the use of additional devices for shielding and placement for radiation therapy.

## 2021-08-31 ENCOUNTER — APPOINTMENT (RX ONLY)
Dept: URBAN - METROPOLITAN AREA CLINIC 37 | Facility: CLINIC | Age: 86
Setting detail: DERMATOLOGY
End: 2021-08-31

## 2021-08-31 ENCOUNTER — APPOINTMENT (RX ONLY)
Dept: URBAN - METROPOLITAN AREA CLINIC 38 | Facility: CLINIC | Age: 86
Setting detail: DERMATOLOGY
End: 2021-08-31

## 2021-08-31 PROBLEM — C44.311 BASAL CELL CARCINOMA OF SKIN OF NOSE: Status: ACTIVE | Noted: 2021-08-31

## 2021-08-31 PROCEDURE — ? SUPERFICIAL RADIATION TREATMENT

## 2021-08-31 PROCEDURE — G6001 ECHO GUIDANCE RADIOTHERAPY: HCPCS

## 2021-08-31 PROCEDURE — ? TREATMENT REGIMEN

## 2021-08-31 PROCEDURE — 77427 RADIATION TX MANAGEMENT X5: CPT

## 2021-08-31 PROCEDURE — 77280 THER RAD SIMULAJ FIELD SMPL: CPT

## 2021-08-31 NOTE — PROCEDURE: TREATMENT REGIMEN
Detail Level: Zone
Plan: This patient has been treated today with image guided superficial radiation therapy for non-melanoma\\nskin cancer.\\nWritten informed consent has been previously obtained from this patient for this treatment. This\\nconsent is documented in the patient’s chart. The patient gave verbal consent to continue treatment\\ntoday.\\nThe patient was treated with a specific radiation dose and setup as prescribed by the provider listed on\\nthis visit note. A Radiation Therapist performed administration of radiation under supervision of\\nprovider. The treatment parameters and cumulative dose are indicated above.\\nPrior to administering the radiation, the patient underwent a verification therapeutic radiology\\nsimulation-aided field setting defining relevant normal and abnormal target anatomy and acquiring\\nimages with high frequency ultrasound in addition to data necessary developing optimal radiation\\ntreatment process for the patient. This process includes verification of the treatment port(s) and proper\\ntreatment positioning. All treatment ports were photographed within electronic medical record. The\\npatient’s customized lead blocking along with gross tumor volume and margin was\\nconfirmed. Considering superficial radiotherapy is clinical in setup, this requires physician and\\nradiation therapist to clarify location interest being treated against initial images, pathology and\\npatient anatomy. Care was taken ensuring fields treated were geometrically accurate and properly\\npositioned using therapeutic radiology simulation-aided field setting verification per fraction. This\\nprocess is also utilized to determine if any prescription or setup changes are necessary. These steps are\\ntherefore medically necessary ensuring safe and effective administration of radiation. Ongoing\\ntherapeutic radiology simulation-aided field setting verification is ordered throughout course of\\ntherapy.\\nA high frequency ultrasound image was acquired today for two-dimensional evaluation of the tumor\\nvolume and response to treatment, in addition to geometric accuracy of field placement. US depth Is\\n________mm, which is ________mm in difference from previous imaging. The field placement and\\nultrasound imaging, per fraction, is separate and distinct from the initial simulation, and is an\\nimportant task in providing safe administration of superficial radiation therapy. Physician evaluation of\\nthe ultrasound tumor depth will be ongoing throughout the course of treatment, and is deemed\\nmedically necessary in order to ensure the efficacy of treatment and any necessary changes. Today’s\\nimage was evaluated for determination of continuation of treatment with the current plan or with\\nnecessary changes as appropriate. According to provider review of verification therapeutic radiology\\nsimulation-aided field setting and imaging, no change is required. Additionally, the use of ultrasound visualization \\nand targeted assessment allows the patient to be able to see their cancer(s) progress, encouraging patient to\\ncomplete and maintain compliance through full course of radiotherapy.\\nPer Dr. oSusa, continued ultrasound guidance and therapeutic radiology simulation-aided field setting\\nverification per fraction is required for field placement, measurement of tumor depth, progress and\\nacute effect monitoring.

## 2021-08-31 NOTE — PROCEDURE: SUPERFICIAL RADIATION TREATMENT
Simple Simulation Afterword Text Will Be Included With Simple Simulations (Indications............): The patient had a complete consultation regarding all applicable modalities for the treatment of their skin cancer and based on a variety of factors including the type of tumor, size, and location, the relevant medical history as well as local tissue factors, the functional status of the individual, the ability to perform necessary postoperative wound instructions and the need for simultaneous treatments as well as overall wound healing status, it was determined that the patient would begin radiation therapy treatment for skin cancer.  A full simulation and treatment device design was performed including the determination and formulation of appropriate simple and complex devices including lead shield of 0.762 mm thickness to form molded customized shielding to specifically correlate with the lesion size including treatment margin.  The custom lead shield is adequate to accommodate the appropriate applicator and provide adequate shielding around the treatment site.  The specific field applicator, shields, and devices both simple and complex as well as the specific patient setup is outlined below.  The patient was given a full consent for superficial radiation to both verbally and in writing and the full determination of patient's eligibility for treatment and selection is outlined on the patient eligibility and treatment selection form.  The specific superficial radiotherapy prescription was determined and was documented on the superficial radiotherapy prescription form.  A treatment calculation was also performed and documented on the treatment calculation form.  Based on the prescription, the patient was scheduled for a series of fractional treatments.
Day Of The Week Treatment Administered: Monday
Treatment Time In Min (Optional): 0.42 min
Fractionation Number: 7
Cumulative Dose In Cgy (Optional): 1911
Daily Fractionated Dose (Optional- Include Units): 273 cGy
Port Dimensions-X Axis In Cm: 2.5
Fractions / Week Rx 3: 5
Dimensions-Y Axis In Cm: 1.5
Include Rx 3 When Rendering Additional Prescriptions: No
Detail Level: Zone
Depth (Optional-Please Include Units): 2.17 mm
Custom Shielding Afterword Text Will Not Be Included With Simple Simulations (X X Y Cm............): port to correlate with the lesion size, including treatment margin. The custom lead shield is adequate to accommodate the appropriate applicator and provide adequate shielding around the treatment site. Additional shielding (as noted below) is used to protect sensitive, normal tissues.
Number Of Days Off Treatment: 1
Dose Per Fractionation In Cgy (Optional): 273
Additional Prescription Justification Text: If there is any interruption in treatment exceeding 5 days please see Decay and Dose Adjustment Calculation and complete treatment under Prescription 2, 3 or 4 as appropriate.
Total Number Of Fractions Rx 4: 15
Patient Positioning: Sitting
Pathology Override (Pathology Will Render As Diagnosis Name If Left Blank): Basal Supratip
Render Patient Eligibility And Selection In Note?: Yes
Total Number Of Fractions: 20
Simple Simulation Preamble Text Will Be Included With Simple Simulations (.......... Indications): Simple simulation was performed today for the following reasons:
Field Size (Applicator): 3.0 cm
Time Dose Fractionation (Optional- Include Units If Applicable): 97
Please Choose The Type Of Visit (Required): Treatment Visit: Show Treatment Variables
Dimensions-X Axis In Cm: 1.3
Body Location Override (Optional): Nasal Supratip
Energy (Optional-Please Include Units): 100 kv
Custom Shielding Preamble Text Will Not Be Included With Simple Simulations (.......... X X Y Cm): A lead shield of 0.762 mm thickness is utilized to form a molded, custom shield with a
Treatment Device Design After Initial Simulation Justification (Will Render If Bill For Treatment Devices = Yes): The patient is status post radiation simulation and is evaluated as to the use of additional devices for shielding and placement for radiation therapy.
Functional Status: 0 (fully active)
Fractions / Week: 3
Intro Statement (Will Not Render If Left Blank): The patient is undergoing superficial radiation therapy for skin cancer and presents for weekly evaluation and management.  Per protocol and as documented on the flow sheet, the patient was questioned as to subjective redness, pruritus, pain, drainage, fatigue, or any other symptoms.  Objectively, the radiation area was evaluated with regards to erythema, atrophy, scale, crusting, erosion, ulceration, edema, purpura, tenderness, warmth, drainage, and any other findings.  The plan was extensively reviewed including the dose, and dosing schedule.  The simulation and clinical setup was also reviewed as was the external and any internal shields and based on this review the appropriateness and sufficiency of treatment was determined.
Assessment: Appropriate reaction
Total Dose (Optional-Please Include Units): 5460 cGy
Shielding Size (Optional- Include Units): 2.5 cm
Information: Selecting Yes will display possible errors in your note based on the variables you have selected. This validation is only offered as a suggestion for you. PLEASE NOTE THAT THE VALIDATION TEXT WILL BE REMOVED WHEN YOU FINALIZE YOUR NOTE. IF YOU WANT TO FAX A PRELIMINARY NOTE YOU WILL NEED TO TOGGLE THIS TO 'NO' IF YOU DO NOT WANT IT IN YOUR FAXED NOTE.

## 2021-08-31 NOTE — PROCEDURE: TREATMENT REGIMEN
Detail Level: Zone
Plan: This patient has been treated today with image guided superficial radiation therapy for non-melanoma\\nskin cancer.\\nWritten informed consent has been previously obtained from this patient for this treatment. This\\nconsent is documented in the patient’s chart. The patient gave verbal consent to continue treatment\\ntoday.\\nThe patient was treated with a specific radiation dose and setup as prescribed by the provider listed on\\nthis visit note. A Radiation Therapist performed administration of radiation under supervision of\\nprovider. The treatment parameters and cumulative dose are indicated above.\\nPrior to administering the radiation, the patient underwent a verification therapeutic radiology\\nsimulation-aided field setting defining relevant normal and abnormal target anatomy and acquiring\\nimages with high frequency ultrasound in addition to data necessary developing optimal radiation\\ntreatment process for the patient. This process includes verification of the treatment port(s) and proper\\ntreatment positioning. All treatment ports were photographed within electronic medical record. The\\npatient’s customized lead blocking along with gross tumor volume and margin was\\nconfirmed. Considering superficial radiotherapy is clinical in setup, this requires physician and\\nradiation therapist to clarify location interest being treated against initial images, pathology and\\npatient anatomy. Care was taken ensuring fields treated were geometrically accurate and properly\\npositioned using therapeutic radiology simulation-aided field setting verification per fraction. This\\nprocess is also utilized to determine if any prescription or setup changes are necessary. These steps are\\ntherefore medically necessary ensuring safe and effective administration of radiation. Ongoing\\ntherapeutic radiology simulation-aided field setting verification is ordered throughout course of\\ntherapy.\\nA high frequency ultrasound image was acquired today for two-dimensional evaluation of the tumor\\nvolume and response to treatment, in addition to geometric accuracy of field placement. US depth Is\\n________mm, which is ________mm in difference from previous imaging. The field placement and\\nultrasound imaging, per fraction, is separate and distinct from the initial simulation, and is an\\nimportant task in providing safe administration of superficial radiation therapy. Physician evaluation of\\nthe ultrasound tumor depth will be ongoing throughout the course of treatment, and is deemed\\nmedically necessary in order to ensure the efficacy of treatment and any necessary changes. Today’s\\nimage was evaluated for determination of continuation of treatment with the current plan or with\\nnecessary changes as appropriate. According to provider review of verification therapeutic radiology\\nsimulation-aided field setting and imaging, no change is required. Additionally, the use of ultrasound visualization \\nand targeted assessment allows the patient to be able to see their cancer(s) progress, encouraging patient to\\ncomplete and maintain compliance through full course of radiotherapy.\\nPer Dr. Sheriff, continued ultrasound guidance and therapeutic radiology simulation-aided field setting\\nverification per fraction is required for field placement, measurement of tumor depth, progress and\\nacute effect monitoring.

## 2021-09-01 ENCOUNTER — APPOINTMENT (RX ONLY)
Dept: URBAN - METROPOLITAN AREA CLINIC 37 | Facility: CLINIC | Age: 86
Setting detail: DERMATOLOGY
End: 2021-09-01

## 2021-09-01 ENCOUNTER — APPOINTMENT (RX ONLY)
Dept: URBAN - METROPOLITAN AREA CLINIC 38 | Facility: CLINIC | Age: 86
Setting detail: DERMATOLOGY
End: 2021-09-01

## 2021-09-01 PROBLEM — C44.311 BASAL CELL CARCINOMA OF SKIN OF NOSE: Status: ACTIVE | Noted: 2021-09-01

## 2021-09-01 PROCEDURE — 77401: CPT

## 2021-09-01 PROCEDURE — ? SUPERFICIAL RADIATION TREATMENT

## 2021-09-01 PROCEDURE — ? TREATMENT REGIMEN

## 2021-09-01 PROCEDURE — 77280 THER RAD SIMULAJ FIELD SMPL: CPT

## 2021-09-01 PROCEDURE — G6001 ECHO GUIDANCE RADIOTHERAPY: HCPCS

## 2021-09-01 NOTE — PROCEDURE: SUPERFICIAL RADIATION TREATMENT
Body Location Override (Optional): Nasal Supratip
Detail Level: Zone
Pathology Override (Pathology Will Render As Diagnosis Name If Left Blank): Basal Supratip
Field Number: 1
Dimensions-X Axis In Cm: 1.3
Dimensions-Y Axis In Cm: 1.5
Render Patient Eligibility And Selection In Note?: Yes
Render Additional Prescriptions In Note?: No
Please Choose The Type Of Visit (Required): Treatment Visit: Show Treatment Variables
Functional Status: 0 (fully active)
Port Dimensions-X Axis In Cm: 2.5
Field Size (Applicator): 3.0 cm
Patient Positioning: Sitting
Custom Shielding Preamble Text Will Not Be Included With Simple Simulations (.......... X X Y Cm): A lead shield of 0.762 mm thickness is utilized to form a molded, custom shield with a
Custom Shielding Afterword Text Will Not Be Included With Simple Simulations (X X Y Cm............): port to correlate with the lesion size, including treatment margin. The custom lead shield is adequate to accommodate the appropriate applicator and provide adequate shielding around the treatment site. Additional shielding (as noted below) is used to protect sensitive, normal tissues.
Initial Radiation Treatment Planning (Will Render If Bill Simulation = Yes): The patient had a complete consultation regarding all applicable modalities for the treatment of their skin cancer and based on a variety of factors including the type of tumor, size, and location, the relevant medical history as well as local tissue factors, the functional status of the individual, the ability to perform necessary postoperative wound instructions and the need for simultaneous treatments as well as overall wound healing status, it was determined that the patient would begin radiation therapy treatment for skin cancer.  A full simulation and treatment device design was performed including the determination and formulation of appropriate simple and complex devices including lead shield of 0.762 mm thickness to form molded customized shielding to specifically correlate with the lesion size including treatment margin.  The custom lead shield is adequate to accommodate the appropriate applicator and provide adequate shielding around the treatment site.  The specific field applicator, shields, and devices both simple and complex as well as the specific patient setup is outlined below.  The patient was given a full consent for superficial radiation to both verbally and in writing and the full determination of patient's eligibility for treatment and selection is outlined on the patient eligibility and treatment selection form.  The specific superficial radiotherapy prescription was determined and was documented on the superficial radiotherapy prescription form.  A treatment calculation was also performed and documented on the treatment calculation form.  Based on the prescription, the patient was scheduled for a series of fractional treatments.
Simple Simulation Preamble Text Will Be Included With Simple Simulations (.......... Indications): Simple simulation was performed today for the following reasons:
Treatment Device Design After Initial Simulation Justification (Will Render If Bill For Treatment Devices = Yes): The patient is status post radiation simulation and is evaluated as to the use of additional devices for shielding and placement for radiation therapy.
Additional Prescription Justification Text: If there is any interruption in treatment exceeding 5 days please see Decay and Dose Adjustment Calculation and complete treatment under Prescription 2, 3 or 4 as appropriate.
Shielding Size (Optional- Include Units): 2.5 cm
Energy (Optional-Please Include Units): 100 kv
Depth (Optional-Please Include Units): 2.17 mm
Fractions / Week: 3
Time Dose Fractionation (Optional- Include Units If Applicable): 97
Daily Fractionated Dose (Optional- Include Units): 273 cGy
Treatment Time / Fractionation (Optional- Include Units): 0.42 min
Total Number Of Fractions: 20
Total Dose (Optional-Please Include Units): 5460 cGy
Fractions / Week Rx 2: 5
Total Number Of Fractions Rx 2: 15
Dose Per Fractionation In Cgy (Optional): 273
Day Of The Week Treatment Administered: Tuesday
Fractionation Number: 8
Cumulative Dose In Cgy (Optional): 2185
Assessment: Appropriate reaction
Intro Statement (Will Not Render If Left Blank): The patient is undergoing superficial radiation therapy for skin cancer and presents for weekly evaluation and management.  Per protocol and as documented on the flow sheet, the patient was questioned as to subjective redness, pruritus, pain, drainage, fatigue, or any other symptoms.  Objectively, the radiation area was evaluated with regards to erythema, atrophy, scale, crusting, erosion, ulceration, edema, purpura, tenderness, warmth, drainage, and any other findings.  The plan was extensively reviewed including the dose, and dosing schedule.  The simulation and clinical setup was also reviewed as was the external and any internal shields and based on this review the appropriateness and sufficiency of treatment was determined.
Information: Selecting Yes will display possible errors in your note based on the variables you have selected. This validation is only offered as a suggestion for you. PLEASE NOTE THAT THE VALIDATION TEXT WILL BE REMOVED WHEN YOU FINALIZE YOUR NOTE. IF YOU WANT TO FAX A PRELIMINARY NOTE YOU WILL NEED TO TOGGLE THIS TO 'NO' IF YOU DO NOT WANT IT IN YOUR FAXED NOTE.

## 2021-09-01 NOTE — PROCEDURE: TREATMENT REGIMEN
Detail Level: Zone
Plan: This patient has been treated today with image guided superficial radiation therapy for non-melanoma\\nskin cancer.\\nWritten informed consent has been previously obtained from this patient for this treatment. This\\nconsent is documented in the patient?s chart. The patient gave verbal consent to continue treatment\\ntoday.\\nThe patient was treated with a specific radiation dose and setup as prescribed by the provider listed on\\nthis visit note. A Radiation Therapist performed administration of radiation under supervision of\\nprovider. The treatment parameters and cumulative dose are indicated above.\\nPrior to administering the radiation, the patient underwent a verification therapeutic radiology\\nsimulation-aided field setting defining relevant normal and abnormal target anatomy and acquiring\\nimages with high frequency ultrasound in addition to data necessary developing optimal radiation\\ntreatment process for the patient. This process includes verification of the treatment port(s) and proper\\ntreatment positioning. All treatment ports were photographed within electronic medical record. The\\npatient?s customized lead blocking along with gross tumor volume and margin was\\nconfirmed. Considering superficial radiotherapy is clinical in setup, this requires physician and\\nradiation therapist to clarify location interest being treated against initial images, pathology and\\npatient anatomy. Care was taken ensuring fields treated were geometrically accurate and properly\\npositioned using therapeutic radiology simulation-aided field setting verification per fraction. This\\nprocess is also utilized to determine if any prescription or setup changes are necessary. These steps are\\ntherefore medically necessary ensuring safe and effective administration of radiation. Ongoing\\ntherapeutic radiology simulation-aided field setting verification is ordered throughout course of\\ntherapy.\\nA high frequency ultrasound image was acquired today for two-dimensional evaluation of the tumor\\nvolume and response to treatment, in addition to geometric accuracy of field placement. US depth Is\\n________mm, which is ________mm in difference from previous imaging. The field placement and\\nultrasound imaging, per fraction, is separate and distinct from the initial simulation, and is an\\nimportant task in providing safe administration of superficial radiation therapy. Physician evaluation of\\nthe ultrasound tumor depth will be ongoing throughout the course of treatment, and is deemed\\nmedically necessary in order to ensure the efficacy of treatment and any necessary changes. Today?s\\nimage was evaluated for determination of continuation of treatment with the current plan or with\\nnecessary changes as appropriate. According to provider review of verification therapeutic radiology\\nsimulation-aided field setting and imaging, no change is required. Additionally, the use of ultrasound visualization \\nand targeted assessment allows the patient to be able to see their cancer(s) progress, encouraging patient to\\ncomplete and maintain compliance through full course of radiotherapy.\\nPer Dr. Sousa, continued ultrasound guidance and therapeutic radiology simulation-aided field setting\\nverification per fraction is required for field placement, measurement of tumor depth, progress and\\nacute effect monitoring.

## 2021-09-01 NOTE — PROCEDURE: SUPERFICIAL RADIATION TREATMENT
Treatment Time In Min (Optional): 0.42 min
Total Number Of Fractions Rx 2: 15
Day Of The Week Treatment Administered: Tuesday
Daily Fractionated Dose (Optional- Include Units): 273 cGy
Simple Simulation Afterword Text Will Be Included With Simple Simulations (Indications............): The patient had a complete consultation regarding all applicable modalities for the treatment of their skin cancer and based on a variety of factors including the type of tumor, size, and location, the relevant medical history as well as local tissue factors, the functional status of the individual, the ability to perform necessary postoperative wound instructions and the need for simultaneous treatments as well as overall wound healing status, it was determined that the patient would begin radiation therapy treatment for skin cancer.  A full simulation and treatment device design was performed including the determination and formulation of appropriate simple and complex devices including lead shield of 0.762 mm thickness to form molded customized shielding to specifically correlate with the lesion size including treatment margin.  The custom lead shield is adequate to accommodate the appropriate applicator and provide adequate shielding around the treatment site.  The specific field applicator, shields, and devices both simple and complex as well as the specific patient setup is outlined below.  The patient was given a full consent for superficial radiation to both verbally and in writing and the full determination of patient's eligibility for treatment and selection is outlined on the patient eligibility and treatment selection form.  The specific superficial radiotherapy prescription was determined and was documented on the superficial radiotherapy prescription form.  A treatment calculation was also performed and documented on the treatment calculation form.  Based on the prescription, the patient was scheduled for a series of fractional treatments.
Dimensions-Y Axis In Cm: 1.5
Assessment: Appropriate reaction
Fractionation Number: 8
Depth (Optional-Please Include Units): 2.17 mm
Custom Shielding Afterword Text Will Not Be Included With Simple Simulations (X X Y Cm............): port to correlate with the lesion size, including treatment margin. The custom lead shield is adequate to accommodate the appropriate applicator and provide adequate shielding around the treatment site. Additional shielding (as noted below) is used to protect sensitive, normal tissues.
Dose Per Fractionation In Cgy (Optional): 273
Number Of Days Off Treatment: 1
Port Dimensions-X Axis In Cm: 2.5
Fractions / Week Rx 3: 5
Cumulative Dose In Cgy (Optional): 2189
Patient Positioning: Sitting
Include Rx 3 When Rendering Additional Prescriptions: No
Detail Level: Zone
Pathology Override (Pathology Will Render As Diagnosis Name If Left Blank): Basal Supratip
Total Number Of Fractions: 20
Additional Prescription Justification Text: If there is any interruption in treatment exceeding 5 days please see Decay and Dose Adjustment Calculation and complete treatment under Prescription 2, 3 or 4 as appropriate.
Render Patient Eligibility And Selection In Note?: Yes
Simple Simulation Preamble Text Will Be Included With Simple Simulations (.......... Indications): Simple simulation was performed today for the following reasons:
Field Size (Applicator): 3.0 cm
Time Dose Fractionation (Optional- Include Units If Applicable): 97
Please Choose The Type Of Visit (Required): Treatment Visit: Show Treatment Variables
Dimensions-X Axis In Cm: 1.3
Energy (Optional-Please Include Units): 100 kv
Custom Shielding Preamble Text Will Not Be Included With Simple Simulations (.......... X X Y Cm): A lead shield of 0.762 mm thickness is utilized to form a molded, custom shield with a
Body Location Override (Optional): Nasal Supratip
Treatment Device Design After Initial Simulation Justification (Will Render If Bill For Treatment Devices = Yes): The patient is status post radiation simulation and is evaluated as to the use of additional devices for shielding and placement for radiation therapy.
Functional Status: 0 (fully active)
Fractions / Week: 3
Intro Statement (Will Not Render If Left Blank): The patient is undergoing superficial radiation therapy for skin cancer and presents for weekly evaluation and management.  Per protocol and as documented on the flow sheet, the patient was questioned as to subjective redness, pruritus, pain, drainage, fatigue, or any other symptoms.  Objectively, the radiation area was evaluated with regards to erythema, atrophy, scale, crusting, erosion, ulceration, edema, purpura, tenderness, warmth, drainage, and any other findings.  The plan was extensively reviewed including the dose, and dosing schedule.  The simulation and clinical setup was also reviewed as was the external and any internal shields and based on this review the appropriateness and sufficiency of treatment was determined.
Shielding Size (Optional- Include Units): 2.5 cm
Information: Selecting Yes will display possible errors in your note based on the variables you have selected. This validation is only offered as a suggestion for you. PLEASE NOTE THAT THE VALIDATION TEXT WILL BE REMOVED WHEN YOU FINALIZE YOUR NOTE. IF YOU WANT TO FAX A PRELIMINARY NOTE YOU WILL NEED TO TOGGLE THIS TO 'NO' IF YOU DO NOT WANT IT IN YOUR FAXED NOTE.
Total Dose (Optional-Please Include Units): 5460 cGy

## 2021-09-02 ENCOUNTER — APPOINTMENT (RX ONLY)
Dept: URBAN - METROPOLITAN AREA CLINIC 38 | Facility: CLINIC | Age: 86
Setting detail: DERMATOLOGY
End: 2021-09-02

## 2021-09-02 ENCOUNTER — APPOINTMENT (RX ONLY)
Dept: URBAN - METROPOLITAN AREA CLINIC 37 | Facility: CLINIC | Age: 86
Setting detail: DERMATOLOGY
End: 2021-09-02

## 2021-09-02 PROBLEM — C44.311 BASAL CELL CARCINOMA OF SKIN OF NOSE: Status: ACTIVE | Noted: 2021-09-02

## 2021-09-02 PROCEDURE — 77280 THER RAD SIMULAJ FIELD SMPL: CPT

## 2021-09-02 PROCEDURE — ? TREATMENT REGIMEN

## 2021-09-02 PROCEDURE — 77401: CPT

## 2021-09-02 PROCEDURE — G6001 ECHO GUIDANCE RADIOTHERAPY: HCPCS

## 2021-09-02 PROCEDURE — ? SUPERFICIAL RADIATION TREATMENT

## 2021-09-02 NOTE — PROCEDURE: SUPERFICIAL RADIATION TREATMENT
Body Location Override (Optional): Nasal Supratip
Detail Level: Zone
Pathology Override (Pathology Will Render As Diagnosis Name If Left Blank): Basal Supratip
Field Number: 1
Dimensions-X Axis In Cm: 1.3
Dimensions-Y Axis In Cm: 1.5
Render Patient Eligibility And Selection In Note?: Yes
Render Additional Prescriptions In Note?: No
Please Choose The Type Of Visit (Required): Treatment Visit: Show Treatment Variables
Functional Status: 0 (fully active)
Port Dimensions-X Axis In Cm: 2.5
Field Size (Applicator): 3.0 cm
Patient Positioning: Sitting
Custom Shielding Preamble Text Will Not Be Included With Simple Simulations (.......... X X Y Cm): A lead shield of 0.762 mm thickness is utilized to form a molded, custom shield with a
Custom Shielding Afterword Text Will Not Be Included With Simple Simulations (X X Y Cm............): port to correlate with the lesion size, including treatment margin. The custom lead shield is adequate to accommodate the appropriate applicator and provide adequate shielding around the treatment site. Additional shielding (as noted below) is used to protect sensitive, normal tissues.
Initial Radiation Treatment Planning (Will Render If Bill Simulation = Yes): The patient had a complete consultation regarding all applicable modalities for the treatment of their skin cancer and based on a variety of factors including the type of tumor, size, and location, the relevant medical history as well as local tissue factors, the functional status of the individual, the ability to perform necessary postoperative wound instructions and the need for simultaneous treatments as well as overall wound healing status, it was determined that the patient would begin radiation therapy treatment for skin cancer.  A full simulation and treatment device design was performed including the determination and formulation of appropriate simple and complex devices including lead shield of 0.762 mm thickness to form molded customized shielding to specifically correlate with the lesion size including treatment margin.  The custom lead shield is adequate to accommodate the appropriate applicator and provide adequate shielding around the treatment site.  The specific field applicator, shields, and devices both simple and complex as well as the specific patient setup is outlined below.  The patient was given a full consent for superficial radiation to both verbally and in writing and the full determination of patient's eligibility for treatment and selection is outlined on the patient eligibility and treatment selection form.  The specific superficial radiotherapy prescription was determined and was documented on the superficial radiotherapy prescription form.  A treatment calculation was also performed and documented on the treatment calculation form.  Based on the prescription, the patient was scheduled for a series of fractional treatments.
Simple Simulation Preamble Text Will Be Included With Simple Simulations (.......... Indications): Simple simulation was performed today for the following reasons:
Treatment Device Design After Initial Simulation Justification (Will Render If Bill For Treatment Devices = Yes): The patient is status post radiation simulation and is evaluated as to the use of additional devices for shielding and placement for radiation therapy.
Additional Prescription Justification Text: If there is any interruption in treatment exceeding 5 days please see Decay and Dose Adjustment Calculation and complete treatment under Prescription 2, 3 or 4 as appropriate.
Shielding Size (Optional- Include Units): 2.5 cm
Energy (Optional-Please Include Units): 100 kv
Depth (Optional-Please Include Units): 2.17 mm
Fractions / Week: 3
Time Dose Fractionation (Optional- Include Units If Applicable): 97
Daily Fractionated Dose (Optional- Include Units): 273 cGy
Treatment Time / Fractionation (Optional- Include Units): 0.42 min
Total Number Of Fractions: 20
Total Dose (Optional-Please Include Units): 5460 cGy
Fractions / Week Rx 2: 5
Total Number Of Fractions Rx 2: 15
Dose Per Fractionation In Cgy (Optional): 273
Day Of The Week Treatment Administered: Thursday
Fractionation Number: 9
Cumulative Dose In Cgy (Optional): 0664
Assessment: Appropriate reaction
Intro Statement (Will Not Render If Left Blank): The patient is undergoing superficial radiation therapy for skin cancer and presents for weekly evaluation and management.  Per protocol and as documented on the flow sheet, the patient was questioned as to subjective redness, pruritus, pain, drainage, fatigue, or any other symptoms.  Objectively, the radiation area was evaluated with regards to erythema, atrophy, scale, crusting, erosion, ulceration, edema, purpura, tenderness, warmth, drainage, and any other findings.  The plan was extensively reviewed including the dose, and dosing schedule.  The simulation and clinical setup was also reviewed as was the external and any internal shields and based on this review the appropriateness and sufficiency of treatment was determined.
Information: Selecting Yes will display possible errors in your note based on the variables you have selected. This validation is only offered as a suggestion for you. PLEASE NOTE THAT THE VALIDATION TEXT WILL BE REMOVED WHEN YOU FINALIZE YOUR NOTE. IF YOU WANT TO FAX A PRELIMINARY NOTE YOU WILL NEED TO TOGGLE THIS TO 'NO' IF YOU DO NOT WANT IT IN YOUR FAXED NOTE.

## 2021-09-02 NOTE — PROCEDURE: TREATMENT REGIMEN
Plan: This patient has been treated today with image guided superficial radiation therapy for non-melanoma\\nskin cancer.\\nWritten informed consent has been previously obtained from this patient for this treatment. This\\nconsent is documented in the patient?s chart. The patient gave verbal consent to continue treatment\\ntoday.\\nThe patient was treated with a specific radiation dose and setup as prescribed by the provider listed on\\nthis visit note. A Radiation Therapist performed administration of radiation under supervision of\\nprovider. The treatment parameters and cumulative dose are indicated above.\\nPrior to administering the radiation, the patient underwent a verification therapeutic radiology\\nsimulation-aided field setting defining relevant normal and abnormal target anatomy and acquiring\\nimages with high frequency ultrasound in addition to data necessary developing optimal radiation\\ntreatment process for the patient. This process includes verification of the treatment port(s) and proper\\ntreatment positioning. All treatment ports were photographed within electronic medical record. The\\npatient?s customized lead blocking along with gross tumor volume and margin was\\nconfirmed. Considering superficial radiotherapy is clinical in setup, this requires physician and\\nradiation therapist to clarify location interest being treated against initial images, pathology and\\npatient anatomy. Care was taken ensuring fields treated were geometrically accurate and properly\\npositioned using therapeutic radiology simulation-aided field setting verification per fraction. This\\nprocess is also utilized to determine if any prescription or setup changes are necessary. These steps are\\ntherefore medically necessary ensuring safe and effective administration of radiation. Ongoing\\ntherapeutic radiology simulation-aided field setting verification is ordered throughout course of\\ntherapy.\\nA high frequency ultrasound image was acquired today for two-dimensional evaluation of the tumor\\nvolume and response to treatment, in addition to geometric accuracy of field placement. US depth Is\\n________mm, which is ________mm in difference from previous imaging. The field placement and\\nultrasound imaging, per fraction, is separate and distinct from the initial simulation, and is an\\nimportant task in providing safe administration of superficial radiation therapy. Physician evaluation of\\nthe ultrasound tumor depth will be ongoing throughout the course of treatment, and is deemed\\nmedically necessary in order to ensure the efficacy of treatment and any necessary changes. Today?s\\nimage was evaluated for determination of continuation of treatment with the current plan or with\\nnecessary changes as appropriate. According to provider review of verification therapeutic radiology\\nsimulation-aided field setting and imaging, no change is required. Additionally, the use of ultrasound visualization \\nand targeted assessment allows the patient to be able to see their cancer(s) progress, encouraging patient to\\ncomplete and maintain compliance through full course of radiotherapy.\\nPer Dr. Sheriff, continued ultrasound guidance and therapeutic radiology simulation-aided field setting\\nverification per fraction is required for field placement, measurement of tumor depth, progress and\\nacute effect monitoring.
Detail Level: Zone

## 2021-09-02 NOTE — PROCEDURE: SUPERFICIAL RADIATION TREATMENT
Bill And Render Text From Evaluation And Management Tab (Will Bill [id number]): Yes
Port Dimensions-Y Axis In Cm: 2.5
Treatment Margins In Cm: 1
Energy (Include Units): 100 kv
Fractionation Number (Evaluation): 5
Fractions / Week: 3
Initial Radiation Treatment Planning (Will Render If Bill Simulation = Yes): The patient had a complete consultation regarding all applicable modalities for the treatment of their skin cancer and based on a variety of factors including the type of tumor, size, and location, the relevant medical history as well as local tissue factors, the functional status of the individual, the ability to perform necessary postoperative wound instructions and the need for simultaneous treatments as well as overall wound healing status, it was determined that the patient would begin radiation therapy treatment for skin cancer.  A full simulation and treatment device design was performed including the determination and formulation of appropriate simple and complex devices including lead shield of 0.762 mm thickness to form molded customized shielding to specifically correlate with the lesion size including treatment margin.  The custom lead shield is adequate to accommodate the appropriate applicator and provide adequate shielding around the treatment site.  The specific field applicator, shields, and devices both simple and complex as well as the specific patient setup is outlined below.  The patient was given a full consent for superficial radiation to both verbally and in writing and the full determination of patient's eligibility for treatment and selection is outlined on the patient eligibility and treatment selection form.  The specific superficial radiotherapy prescription was determined and was documented on the superficial radiotherapy prescription form.  A treatment calculation was also performed and documented on the treatment calculation form.  Based on the prescription, the patient was scheduled for a series of fractional treatments.
Render Text From Evaluation And Management Tab (Will Not Bill [id number]): No
Total Dose (Optional-Please Include Units): 5460 cGy
Field Size (Applicator): 3.0 cm
Functional Status: 0 (fully active)
Information: Selecting Yes will display possible errors in your note based on the variables you have selected. This validation is only offered as a suggestion for you. PLEASE NOTE THAT THE VALIDATION TEXT WILL BE REMOVED WHEN YOU FINALIZE YOUR NOTE. IF YOU WANT TO FAX A PRELIMINARY NOTE YOU WILL NEED TO TOGGLE THIS TO 'NO' IF YOU DO NOT WANT IT IN YOUR FAXED NOTE.
Total Number Of Fractions Rx 2: 15
Shielding Size (Optional- Include Units): 2.5 cm
Intro Statement (Will Not Render If Left Blank): The patient is undergoing superficial radiation therapy for skin cancer and presents for weekly evaluation and management.  Per protocol and as documented on the flow sheet, the patient was questioned as to subjective redness, pruritus, pain, drainage, fatigue, or any other symptoms.  Objectively, the radiation area was evaluated with regards to erythema, atrophy, scale, crusting, erosion, ulceration, edema, purpura, tenderness, warmth, drainage, and any other findings.  The plan was extensively reviewed including the dose, and dosing schedule.  The simulation and clinical setup was also reviewed as was the external and any internal shields and based on this review the appropriateness and sufficiency of treatment was determined.
Daily Fractionated Dose (Optional- Include Units): 273 cGy
Treatment Time In Min (Optional): 0.42 min
Fractionation Number: 9
Day Of The Week Treatment Administered: Thursday
Dimensions-Y Axis In Cm: 1.5
Assessment: Appropriate reaction
Detail Level: Zone
Depth (Optional-Please Include Units): 2.17 mm
Custom Shielding Afterword Text Will Not Be Included With Simple Simulations (X X Y Cm............): port to correlate with the lesion size, including treatment margin. The custom lead shield is adequate to accommodate the appropriate applicator and provide adequate shielding around the treatment site. Additional shielding (as noted below) is used to protect sensitive, normal tissues.
Dose Per Fractionation In Cgy (Optional): 273
Pathology Override (Pathology Will Render As Diagnosis Name If Left Blank): Basal Supratip
Total Number Of Fractions: 20
Additional Prescription Justification Text: If there is any interruption in treatment exceeding 5 days please see Decay and Dose Adjustment Calculation and complete treatment under Prescription 2, 3 or 4 as appropriate.
Cumulative Dose In Cgy (Optional): 2953
Patient Positioning: Sitting
Time Dose Fractionation (Optional- Include Units If Applicable): 97
Simple Simulation Preamble Text Will Be Included With Simple Simulations (.......... Indications): Simple simulation was performed today for the following reasons:
Please Choose The Type Of Visit (Required): Treatment Visit: Show Treatment Variables
Dimensions-X Axis In Cm: 1.3
Body Location Override (Optional): Nasal Supratip
Custom Shielding Preamble Text Will Not Be Included With Simple Simulations (.......... X X Y Cm): A lead shield of 0.762 mm thickness is utilized to form a molded, custom shield with a
Treatment Device Design After Initial Simulation Justification (Will Render If Bill For Treatment Devices = Yes): The patient is status post radiation simulation and is evaluated as to the use of additional devices for shielding and placement for radiation therapy.

## 2021-09-08 ENCOUNTER — APPOINTMENT (RX ONLY)
Dept: URBAN - METROPOLITAN AREA CLINIC 38 | Facility: CLINIC | Age: 86
Setting detail: DERMATOLOGY
End: 2021-09-08

## 2021-09-08 ENCOUNTER — APPOINTMENT (RX ONLY)
Dept: URBAN - METROPOLITAN AREA CLINIC 37 | Facility: CLINIC | Age: 86
Setting detail: DERMATOLOGY
End: 2021-09-08

## 2021-09-08 PROBLEM — C44.311 BASAL CELL CARCINOMA OF SKIN OF NOSE: Status: ACTIVE | Noted: 2021-09-08

## 2021-09-08 PROCEDURE — ? TREATMENT REGIMEN

## 2021-09-08 PROCEDURE — 77427 RADIATION TX MANAGEMENT X5: CPT

## 2021-09-08 PROCEDURE — G6001 ECHO GUIDANCE RADIOTHERAPY: HCPCS

## 2021-09-08 PROCEDURE — 77401: CPT

## 2021-09-08 PROCEDURE — 77280 THER RAD SIMULAJ FIELD SMPL: CPT

## 2021-09-08 PROCEDURE — ? SUPERFICIAL RADIATION TREATMENT

## 2021-09-08 NOTE — PROCEDURE: SUPERFICIAL RADIATION TREATMENT
Body Location Override (Optional): Nasal Supratip
Detail Level: Zone
Pathology Override (Pathology Will Render As Diagnosis Name If Left Blank): Basal Supratip
Field Number: 1
Dimensions-X Axis In Cm: 1.3
Dimensions-Y Axis In Cm: 1.5
Render Patient Eligibility And Selection In Note?: Yes
Render Additional Prescriptions In Note?: No
Please Choose The Type Of Visit (Required): Weekly Evaluation Visit: Show Weekly Evaluation Variables
Functional Status: 0 (fully active)
Port Dimensions-X Axis In Cm: 2.5
Field Size (Applicator): 3.0 cm
Patient Positioning: Sitting
Custom Shielding Preamble Text Will Not Be Included With Simple Simulations (.......... X X Y Cm): A lead shield of 0.762 mm thickness is utilized to form a molded, custom shield with a
Custom Shielding Afterword Text Will Not Be Included With Simple Simulations (X X Y Cm............): port to correlate with the lesion size, including treatment margin. The custom lead shield is adequate to accommodate the appropriate applicator and provide adequate shielding around the treatment site. Additional shielding (as noted below) is used to protect sensitive, normal tissues.
Initial Radiation Treatment Planning (Will Render If Bill Simulation = Yes): The patient had a complete consultation regarding all applicable modalities for the treatment of their skin cancer and based on a variety of factors including the type of tumor, size, and location, the relevant medical history as well as local tissue factors, the functional status of the individual, the ability to perform necessary postoperative wound instructions and the need for simultaneous treatments as well as overall wound healing status, it was determined that the patient would begin radiation therapy treatment for skin cancer.  A full simulation and treatment device design was performed including the determination and formulation of appropriate simple and complex devices including lead shield of 0.762 mm thickness to form molded customized shielding to specifically correlate with the lesion size including treatment margin.  The custom lead shield is adequate to accommodate the appropriate applicator and provide adequate shielding around the treatment site.  The specific field applicator, shields, and devices both simple and complex as well as the specific patient setup is outlined below.  The patient was given a full consent for superficial radiation to both verbally and in writing and the full determination of patient's eligibility for treatment and selection is outlined on the patient eligibility and treatment selection form.  The specific superficial radiotherapy prescription was determined and was documented on the superficial radiotherapy prescription form.  A treatment calculation was also performed and documented on the treatment calculation form.  Based on the prescription, the patient was scheduled for a series of fractional treatments.
Simple Simulation Preamble Text Will Be Included With Simple Simulations (.......... Indications): Simple simulation was performed today for the following reasons:
Treatment Device Design After Initial Simulation Justification (Will Render If Bill For Treatment Devices = Yes): The patient is status post radiation simulation and is evaluated as to the use of additional devices for shielding and placement for radiation therapy.
Additional Prescription Justification Text: If there is any interruption in treatment exceeding 5 days please see Decay and Dose Adjustment Calculation and complete treatment under Prescription 2, 3 or 4 as appropriate.
Shielding Size (Optional- Include Units): 2.5 cm
Energy (Optional-Please Include Units): 100 kv
Depth (Optional-Please Include Units): 2.17 mm
Fractions / Week: 3
Time Dose Fractionation (Optional- Include Units If Applicable): 97
Daily Fractionated Dose (Optional- Include Units): 273 cGy
Treatment Time / Fractionation (Optional- Include Units): 0.42 min
Total Number Of Fractions: 20
Total Dose (Optional-Please Include Units): 5460 cGy
Fractions / Week Rx 2: 5
Total Number Of Fractions Rx 2: 15
Dose Per Fractionation In Cgy (Optional): 273
Day Of The Week Treatment Administered: Tuesday
Fractionation Number: 10
Cumulative Dose In Cgy (Optional): 4192
Assessment: Appropriate reaction
Intro Statement (Will Not Render If Left Blank): The patient is undergoing superficial radiation therapy for skin cancer and presents for weekly evaluation and management.  Per protocol and as documented on the flow sheet, the patient was questioned as to subjective redness, pruritus, pain, drainage, fatigue, or any other symptoms.  Objectively, the radiation area was evaluated with regards to erythema, atrophy, scale, crusting, erosion, ulceration, edema, purpura, tenderness, warmth, drainage, and any other findings.  The plan was extensively reviewed including the dose, and dosing schedule.  The simulation and clinical setup was also reviewed as was the external and any internal shields and based on this review the appropriateness and sufficiency of treatment was determined.
Information: Selecting Yes will display possible errors in your note based on the variables you have selected. This validation is only offered as a suggestion for you. PLEASE NOTE THAT THE VALIDATION TEXT WILL BE REMOVED WHEN YOU FINALIZE YOUR NOTE. IF YOU WANT TO FAX A PRELIMINARY NOTE YOU WILL NEED TO TOGGLE THIS TO 'NO' IF YOU DO NOT WANT IT IN YOUR FAXED NOTE.

## 2021-09-08 NOTE — PROCEDURE: SUPERFICIAL RADIATION TREATMENT
Total Number Of Fractions Rx 2: 15
Shielding Size (Optional- Include Units): 2.5 cm
Total Dose (Optional-Please Include Units): 5460 cGy
Field Size (Applicator): 3.0 cm
Functional Status: 0 (fully active)
Treatment Time In Min (Optional): 0.42 min
Port Dimensions-Y Axis In Cm: 2.5
Simple Simulation Afterword Text Will Be Included With Simple Simulations (Indications............): The patient had a complete consultation regarding all applicable modalities for the treatment of their skin cancer and based on a variety of factors including the type of tumor, size, and location, the relevant medical history as well as local tissue factors, the functional status of the individual, the ability to perform necessary postoperative wound instructions and the need for simultaneous treatments as well as overall wound healing status, it was determined that the patient would begin radiation therapy treatment for skin cancer.  A full simulation and treatment device design was performed including the determination and formulation of appropriate simple and complex devices including lead shield of 0.762 mm thickness to form molded customized shielding to specifically correlate with the lesion size including treatment margin.  The custom lead shield is adequate to accommodate the appropriate applicator and provide adequate shielding around the treatment site.  The specific field applicator, shields, and devices both simple and complex as well as the specific patient setup is outlined below.  The patient was given a full consent for superficial radiation to both verbally and in writing and the full determination of patient's eligibility for treatment and selection is outlined on the patient eligibility and treatment selection form.  The specific superficial radiotherapy prescription was determined and was documented on the superficial radiotherapy prescription form.  A treatment calculation was also performed and documented on the treatment calculation form.  Based on the prescription, the patient was scheduled for a series of fractional treatments.
Day Of The Week Treatment Administered: Tuesday
Intro Statement (Will Not Render If Left Blank): The patient is undergoing superficial radiation therapy for skin cancer and presents for weekly evaluation and management.  Per protocol and as documented on the flow sheet, the patient was questioned as to subjective redness, pruritus, pain, drainage, fatigue, or any other symptoms.  Objectively, the radiation area was evaluated with regards to erythema, atrophy, scale, crusting, erosion, ulceration, edema, purpura, tenderness, warmth, drainage, and any other findings.  The plan was extensively reviewed including the dose, and dosing schedule.  The simulation and clinical setup was also reviewed as was the external and any internal shields and based on this review the appropriateness and sufficiency of treatment was determined.
Daily Fractionated Dose (Optional- Include Units): 273 cGy
Fractions / Week Rx 3: 5
Dimensions-Y Axis In Cm: 1.5
Information: Selecting Yes will display possible errors in your note based on the variables you have selected. This validation is only offered as a suggestion for you. PLEASE NOTE THAT THE VALIDATION TEXT WILL BE REMOVED WHEN YOU FINALIZE YOUR NOTE. IF YOU WANT TO FAX A PRELIMINARY NOTE YOU WILL NEED TO TOGGLE THIS TO 'NO' IF YOU DO NOT WANT IT IN YOUR FAXED NOTE.
Validate Note Data (See Information Below): Yes
Fractionation Number: 10
Include Rx 3 When Rendering Additional Prescriptions: No
Depth (Optional-Please Include Units): 2.17 mm
Custom Shielding Afterword Text Will Not Be Included With Simple Simulations (X X Y Cm............): port to correlate with the lesion size, including treatment margin. The custom lead shield is adequate to accommodate the appropriate applicator and provide adequate shielding around the treatment site. Additional shielding (as noted below) is used to protect sensitive, normal tissues.
Dose Per Fractionation In Cgy (Optional): 273
Assessment: Appropriate reaction
Cumulative Dose In Cgy (Optional): 8261
Patient Positioning: Sitting
Detail Level: Zone
Pathology Override (Pathology Will Render As Diagnosis Name If Left Blank): Basal Supratip
Total Number Of Fractions: 20
Additional Prescription Justification Text: If there is any interruption in treatment exceeding 5 days please see Decay and Dose Adjustment Calculation and complete treatment under Prescription 2, 3 or 4 as appropriate.
Number Of Days Off Treatment: 1
Time Dose Fractionation (Optional- Include Units If Applicable): 97
Simple Simulation Preamble Text Will Be Included With Simple Simulations (.......... Indications): Simple simulation was performed today for the following reasons:
Please Choose The Type Of Visit (Required): Weekly Evaluation Visit: Show Weekly Evaluation Variables
Dimensions-X Axis In Cm: 1.3
Energy (Optional-Please Include Units): 100 kv
Custom Shielding Preamble Text Will Not Be Included With Simple Simulations (.......... X X Y Cm): A lead shield of 0.762 mm thickness is utilized to form a molded, custom shield with a
Body Location Override (Optional): Nasal Supratip
Treatment Device Design After Initial Simulation Justification (Will Render If Bill For Treatment Devices = Yes): The patient is status post radiation simulation and is evaluated as to the use of additional devices for shielding and placement for radiation therapy.
Fractions / Week: 3

## 2021-09-09 ENCOUNTER — APPOINTMENT (RX ONLY)
Dept: URBAN - METROPOLITAN AREA CLINIC 38 | Facility: CLINIC | Age: 86
Setting detail: DERMATOLOGY
End: 2021-09-09

## 2021-09-09 ENCOUNTER — APPOINTMENT (RX ONLY)
Dept: URBAN - METROPOLITAN AREA CLINIC 37 | Facility: CLINIC | Age: 86
Setting detail: DERMATOLOGY
End: 2021-09-09

## 2021-09-09 PROBLEM — C44.311 BASAL CELL CARCINOMA OF SKIN OF NOSE: Status: ACTIVE | Noted: 2021-09-09

## 2021-09-09 PROCEDURE — G6001 ECHO GUIDANCE RADIOTHERAPY: HCPCS

## 2021-09-09 PROCEDURE — ? SUPERFICIAL RADIATION TREATMENT

## 2021-09-09 PROCEDURE — 77280 THER RAD SIMULAJ FIELD SMPL: CPT

## 2021-09-09 PROCEDURE — 77401: CPT

## 2021-09-09 PROCEDURE — ? TREATMENT REGIMEN

## 2021-09-09 NOTE — PROCEDURE: SUPERFICIAL RADIATION TREATMENT
Body Location Override (Optional): Nasal Supratip
Detail Level: Zone
Pathology Override (Pathology Will Render As Diagnosis Name If Left Blank): Basal Supratip
Field Number: 1
Dimensions-X Axis In Cm: 1.3
Dimensions-Y Axis In Cm: 1.5
Render Patient Eligibility And Selection In Note?: Yes
Render Additional Prescriptions In Note?: No
Please Choose The Type Of Visit (Required): Treatment Visit: Show Treatment Variables
Functional Status: 0 (fully active)
Port Dimensions-X Axis In Cm: 2.5
Field Size (Applicator): 3.0 cm
Patient Positioning: Sitting
Custom Shielding Preamble Text Will Not Be Included With Simple Simulations (.......... X X Y Cm): A lead shield of 0.762 mm thickness is utilized to form a molded, custom shield with a
Custom Shielding Afterword Text Will Not Be Included With Simple Simulations (X X Y Cm............): port to correlate with the lesion size, including treatment margin. The custom lead shield is adequate to accommodate the appropriate applicator and provide adequate shielding around the treatment site. Additional shielding (as noted below) is used to protect sensitive, normal tissues.
Initial Radiation Treatment Planning (Will Render If Bill Simulation = Yes): The patient had a complete consultation regarding all applicable modalities for the treatment of their skin cancer and based on a variety of factors including the type of tumor, size, and location, the relevant medical history as well as local tissue factors, the functional status of the individual, the ability to perform necessary postoperative wound instructions and the need for simultaneous treatments as well as overall wound healing status, it was determined that the patient would begin radiation therapy treatment for skin cancer.  A full simulation and treatment device design was performed including the determination and formulation of appropriate simple and complex devices including lead shield of 0.762 mm thickness to form molded customized shielding to specifically correlate with the lesion size including treatment margin.  The custom lead shield is adequate to accommodate the appropriate applicator and provide adequate shielding around the treatment site.  The specific field applicator, shields, and devices both simple and complex as well as the specific patient setup is outlined below.  The patient was given a full consent for superficial radiation to both verbally and in writing and the full determination of patient's eligibility for treatment and selection is outlined on the patient eligibility and treatment selection form.  The specific superficial radiotherapy prescription was determined and was documented on the superficial radiotherapy prescription form.  A treatment calculation was also performed and documented on the treatment calculation form.  Based on the prescription, the patient was scheduled for a series of fractional treatments.
Simple Simulation Preamble Text Will Be Included With Simple Simulations (.......... Indications): Simple simulation was performed today for the following reasons:
Treatment Device Design After Initial Simulation Justification (Will Render If Bill For Treatment Devices = Yes): The patient is status post radiation simulation and is evaluated as to the use of additional devices for shielding and placement for radiation therapy.
Additional Prescription Justification Text: If there is any interruption in treatment exceeding 5 days please see Decay and Dose Adjustment Calculation and complete treatment under Prescription 2, 3 or 4 as appropriate.
Shielding Size (Optional- Include Units): 2.5 cm
Energy (Optional-Please Include Units): 100 kv
Depth (Optional-Please Include Units): 2.17 mm
Fractions / Week: 3
Time Dose Fractionation (Optional- Include Units If Applicable): 97
Daily Fractionated Dose (Optional- Include Units): 273 cGy
Treatment Time / Fractionation (Optional- Include Units): 0.42 min
Total Number Of Fractions: 20
Total Dose (Optional-Please Include Units): 5460 cGy
Fractions / Week Rx 2: 5
Total Number Of Fractions Rx 2: 15
Dose Per Fractionation In Cgy (Optional): 273
Day Of The Week Treatment Administered: Wednesday
Fractionation Number: 11
Cumulative Dose In Cgy (Optional): 3003
Assessment: Appropriate reaction
Intro Statement (Will Not Render If Left Blank): The patient is undergoing superficial radiation therapy for skin cancer and presents for weekly evaluation and management.  Per protocol and as documented on the flow sheet, the patient was questioned as to subjective redness, pruritus, pain, drainage, fatigue, or any other symptoms.  Objectively, the radiation area was evaluated with regards to erythema, atrophy, scale, crusting, erosion, ulceration, edema, purpura, tenderness, warmth, drainage, and any other findings.  The plan was extensively reviewed including the dose, and dosing schedule.  The simulation and clinical setup was also reviewed as was the external and any internal shields and based on this review the appropriateness and sufficiency of treatment was determined.
Information: Selecting Yes will display possible errors in your note based on the variables you have selected. This validation is only offered as a suggestion for you. PLEASE NOTE THAT THE VALIDATION TEXT WILL BE REMOVED WHEN YOU FINALIZE YOUR NOTE. IF YOU WANT TO FAX A PRELIMINARY NOTE YOU WILL NEED TO TOGGLE THIS TO 'NO' IF YOU DO NOT WANT IT IN YOUR FAXED NOTE.

## 2021-09-09 NOTE — PROCEDURE: TREATMENT REGIMEN
Detail Level: Zone
Plan: This patient has been treated today with image guided superficial radiation therapy for non-melanoma\\nskin cancer.\\nWritten informed consent has been previously obtained from this patient for this treatment. This\\nconsent is documented in the patient?s chart. The patient gave verbal consent to continue treatment\\ntoday.\\nThe patient was treated with a specific radiation dose and setup as prescribed by the provider listed on\\nthis visit note. A Radiation Therapist performed administration of radiation under supervision of\\nprovider. The treatment parameters and cumulative dose are indicated above.\\nPrior to administering the radiation, the patient underwent a verification therapeutic radiology\\nsimulation-aided field setting defining relevant normal and abnormal target anatomy and acquiring\\nimages with high frequency ultrasound in addition to data necessary developing optimal radiation\\ntreatment process for the patient. This process includes verification of the treatment port(s) and proper\\ntreatment positioning. All treatment ports were photographed within electronic medical record. The\\npatient?s customized lead blocking along with gross tumor volume and margin was\\nconfirmed. Considering superficial radiotherapy is clinical in setup, this requires physician and\\nradiation therapist to clarify location interest being treated against initial images, pathology and\\npatient anatomy. Care was taken ensuring fields treated were geometrically accurate and properly\\npositioned using therapeutic radiology simulation-aided field setting verification per fraction. This\\nprocess is also utilized to determine if any prescription or setup changes are necessary. These steps are\\ntherefore medically necessary ensuring safe and effective administration of radiation. Ongoing\\ntherapeutic radiology simulation-aided field setting verification is ordered throughout course of\\ntherapy.\\nA high frequency ultrasound image was acquired today for two-dimensional evaluation of the tumor\\nvolume and response to treatment, in addition to geometric accuracy of field placement. US depth Is\\n________mm, which is ________mm in difference from previous imaging. The field placement and\\nultrasound imaging, per fraction, is separate and distinct from the initial simulation, and is an\\nimportant task in providing safe administration of superficial radiation therapy. Physician evaluation of\\nthe ultrasound tumor depth will be ongoing throughout the course of treatment, and is deemed\\nmedically necessary in order to ensure the efficacy of treatment and any necessary changes. Today?s\\nimage was evaluated for determination of continuation of treatment with the current plan or with\\nnecessary changes as appropriate. According to provider review of verification therapeutic radiology\\nsimulation-aided field setting and imaging, no change is required. Additionally, the use of ultrasound visualization \\nand targeted assessment allows the patient to be able to see their cancer(s) progress, encouraging patient to\\ncomplete and maintain compliance through full course of radiotherapy.\\nPer Dr. Sheriff, continued ultrasound guidance and therapeutic radiology simulation-aided field setting\\nverification per fraction is required for field placement, measurement of tumor depth, progress and\\nacute effect monitoring.

## 2021-09-09 NOTE — PROCEDURE: SUPERFICIAL RADIATION TREATMENT
Detail Level: Zone
Late entry  Discussed with patient at that time. Ok to get COVID vaccine  
Cumulative Dose In Cgy (Optional): 3003
Patient Positioning: Sitting
Depth (Optional-Please Include Units): 2.17 mm
Custom Shielding Afterword Text Will Not Be Included With Simple Simulations (X X Y Cm............): port to correlate with the lesion size, including treatment margin. The custom lead shield is adequate to accommodate the appropriate applicator and provide adequate shielding around the treatment site. Additional shielding (as noted below) is used to protect sensitive, normal tissues.
Dose Per Fractionation In Cgy (Optional): 273
Additional Prescription Justification Text: If there is any interruption in treatment exceeding 5 days please see Decay and Dose Adjustment Calculation and complete treatment under Prescription 2, 3 or 4 as appropriate.
Total Number Of Fractions Rx 4: 15
Pathology Override (Pathology Will Render As Diagnosis Name If Left Blank): Basal Supratip
Total Number Of Fractions: 20
Render Patient Eligibility And Selection In Note?: Yes
Number Of Days Off Treatment: 1
Port Dimensions-X Axis In Cm: 2.5
Time Dose Fractionation (Optional- Include Units If Applicable): 97
Simple Simulation Preamble Text Will Be Included With Simple Simulations (.......... Indications): Simple simulation was performed today for the following reasons:
Render Additional Prescriptions In Note?: No
Fractions / Week Rx 2: 5
Field Size (Applicator): 3.0 cm
Please Choose The Type Of Visit (Required): Treatment Visit: Show Treatment Variables
Dimensions-X Axis In Cm: 1.3
Body Location Override (Optional): Nasal Supratip
Energy (Optional-Please Include Units): 100 kv
Custom Shielding Preamble Text Will Not Be Included With Simple Simulations (.......... X X Y Cm): A lead shield of 0.762 mm thickness is utilized to form a molded, custom shield with a
Treatment Time / Fractionation (Optional- Include Units): 0.42 min
Treatment Device Design After Initial Simulation Justification (Will Render If Bill For Treatment Devices = Yes): The patient is status post radiation simulation and is evaluated as to the use of additional devices for shielding and placement for radiation therapy.
Fractions / Week: 3
Initial Radiation Treatment Planning (Will Render If Bill Simulation = Yes): The patient had a complete consultation regarding all applicable modalities for the treatment of their skin cancer and based on a variety of factors including the type of tumor, size, and location, the relevant medical history as well as local tissue factors, the functional status of the individual, the ability to perform necessary postoperative wound instructions and the need for simultaneous treatments as well as overall wound healing status, it was determined that the patient would begin radiation therapy treatment for skin cancer.  A full simulation and treatment device design was performed including the determination and formulation of appropriate simple and complex devices including lead shield of 0.762 mm thickness to form molded customized shielding to specifically correlate with the lesion size including treatment margin.  The custom lead shield is adequate to accommodate the appropriate applicator and provide adequate shielding around the treatment site.  The specific field applicator, shields, and devices both simple and complex as well as the specific patient setup is outlined below.  The patient was given a full consent for superficial radiation to both verbally and in writing and the full determination of patient's eligibility for treatment and selection is outlined on the patient eligibility and treatment selection form.  The specific superficial radiotherapy prescription was determined and was documented on the superficial radiotherapy prescription form.  A treatment calculation was also performed and documented on the treatment calculation form.  Based on the prescription, the patient was scheduled for a series of fractional treatments.
Functional Status: 0 (fully active)
Total Dose (Optional-Please Include Units): 5460 cGy
Shielding Size (Optional- Include Units): 2.5 cm
Intro Statement (Will Not Render If Left Blank): The patient is undergoing superficial radiation therapy for skin cancer and presents for weekly evaluation and management.  Per protocol and as documented on the flow sheet, the patient was questioned as to subjective redness, pruritus, pain, drainage, fatigue, or any other symptoms.  Objectively, the radiation area was evaluated with regards to erythema, atrophy, scale, crusting, erosion, ulceration, edema, purpura, tenderness, warmth, drainage, and any other findings.  The plan was extensively reviewed including the dose, and dosing schedule.  The simulation and clinical setup was also reviewed as was the external and any internal shields and based on this review the appropriateness and sufficiency of treatment was determined.
Daily Fractionated Dose (Optional- Include Units): 273 cGy
Day Of The Week Treatment Administered: Wednesday
Fractionation Number: 11
Dimensions-Y Axis In Cm: 1.5
Information: Selecting Yes will display possible errors in your note based on the variables you have selected. This validation is only offered as a suggestion for you. PLEASE NOTE THAT THE VALIDATION TEXT WILL BE REMOVED WHEN YOU FINALIZE YOUR NOTE. IF YOU WANT TO FAX A PRELIMINARY NOTE YOU WILL NEED TO TOGGLE THIS TO 'NO' IF YOU DO NOT WANT IT IN YOUR FAXED NOTE.
Assessment: Appropriate reaction

## 2021-09-10 ENCOUNTER — APPOINTMENT (RX ONLY)
Dept: URBAN - METROPOLITAN AREA CLINIC 37 | Facility: CLINIC | Age: 86
Setting detail: DERMATOLOGY
End: 2021-09-10

## 2021-09-10 ENCOUNTER — APPOINTMENT (RX ONLY)
Dept: URBAN - METROPOLITAN AREA CLINIC 38 | Facility: CLINIC | Age: 86
Setting detail: DERMATOLOGY
End: 2021-09-10

## 2021-09-10 PROBLEM — C44.311 BASAL CELL CARCINOMA OF SKIN OF NOSE: Status: ACTIVE | Noted: 2021-09-10

## 2021-09-10 PROCEDURE — G6001 ECHO GUIDANCE RADIOTHERAPY: HCPCS

## 2021-09-10 PROCEDURE — 77401: CPT

## 2021-09-10 PROCEDURE — ? SUPERFICIAL RADIATION TREATMENT

## 2021-09-10 PROCEDURE — ? TREATMENT REGIMEN

## 2021-09-10 PROCEDURE — 77280 THER RAD SIMULAJ FIELD SMPL: CPT

## 2021-09-10 NOTE — PROCEDURE: SUPERFICIAL RADIATION TREATMENT
Body Location Override (Optional): Nasal Supratip
Detail Level: Zone
Pathology Override (Pathology Will Render As Diagnosis Name If Left Blank): Basal Supratip
Field Number: 1
Dimensions-X Axis In Cm: 1.3
Dimensions-Y Axis In Cm: 1.5
Render Patient Eligibility And Selection In Note?: Yes
Render Additional Prescriptions In Note?: No
Please Choose The Type Of Visit (Required): Treatment Visit: Show Treatment Variables
Functional Status: 0 (fully active)
Port Dimensions-X Axis In Cm: 2.5
Field Size (Applicator): 3.0 cm
Patient Positioning: Sitting
Custom Shielding Preamble Text Will Not Be Included With Simple Simulations (.......... X X Y Cm): A lead shield of 0.762 mm thickness is utilized to form a molded, custom shield with a
Custom Shielding Afterword Text Will Not Be Included With Simple Simulations (X X Y Cm............): port to correlate with the lesion size, including treatment margin. The custom lead shield is adequate to accommodate the appropriate applicator and provide adequate shielding around the treatment site. Additional shielding (as noted below) is used to protect sensitive, normal tissues.
Initial Radiation Treatment Planning (Will Render If Bill Simulation = Yes): The patient had a complete consultation regarding all applicable modalities for the treatment of their skin cancer and based on a variety of factors including the type of tumor, size, and location, the relevant medical history as well as local tissue factors, the functional status of the individual, the ability to perform necessary postoperative wound instructions and the need for simultaneous treatments as well as overall wound healing status, it was determined that the patient would begin radiation therapy treatment for skin cancer.  A full simulation and treatment device design was performed including the determination and formulation of appropriate simple and complex devices including lead shield of 0.762 mm thickness to form molded customized shielding to specifically correlate with the lesion size including treatment margin.  The custom lead shield is adequate to accommodate the appropriate applicator and provide adequate shielding around the treatment site.  The specific field applicator, shields, and devices both simple and complex as well as the specific patient setup is outlined below.  The patient was given a full consent for superficial radiation to both verbally and in writing and the full determination of patient's eligibility for treatment and selection is outlined on the patient eligibility and treatment selection form.  The specific superficial radiotherapy prescription was determined and was documented on the superficial radiotherapy prescription form.  A treatment calculation was also performed and documented on the treatment calculation form.  Based on the prescription, the patient was scheduled for a series of fractional treatments.
Simple Simulation Preamble Text Will Be Included With Simple Simulations (.......... Indications): Simple simulation was performed today for the following reasons:
Treatment Device Design After Initial Simulation Justification (Will Render If Bill For Treatment Devices = Yes): The patient is status post radiation simulation and is evaluated as to the use of additional devices for shielding and placement for radiation therapy.
Additional Prescription Justification Text: If there is any interruption in treatment exceeding 5 days please see Decay and Dose Adjustment Calculation and complete treatment under Prescription 2, 3 or 4 as appropriate.
Shielding Size (Optional- Include Units): 2.5 cm
Energy (Optional-Please Include Units): 100 kv
Depth (Optional-Please Include Units): 2.17 mm
Fractions / Week: 3
Time Dose Fractionation (Optional- Include Units If Applicable): 97
Daily Fractionated Dose (Optional- Include Units): 273 cGy
Treatment Time / Fractionation (Optional- Include Units): 0.42 min
Total Number Of Fractions: 20
Total Dose (Optional-Please Include Units): 5460 cGy
Fractions / Week Rx 2: 5
Total Number Of Fractions Rx 2: 15
Dose Per Fractionation In Cgy (Optional): 273
Day Of The Week Treatment Administered: Thursday
Fractionation Number: 12
Cumulative Dose In Cgy (Optional): 2145
Assessment: Appropriate reaction
Intro Statement (Will Not Render If Left Blank): The patient is undergoing superficial radiation therapy for skin cancer and presents for weekly evaluation and management.  Per protocol and as documented on the flow sheet, the patient was questioned as to subjective redness, pruritus, pain, drainage, fatigue, or any other symptoms.  Objectively, the radiation area was evaluated with regards to erythema, atrophy, scale, crusting, erosion, ulceration, edema, purpura, tenderness, warmth, drainage, and any other findings.  The plan was extensively reviewed including the dose, and dosing schedule.  The simulation and clinical setup was also reviewed as was the external and any internal shields and based on this review the appropriateness and sufficiency of treatment was determined.
Information: Selecting Yes will display possible errors in your note based on the variables you have selected. This validation is only offered as a suggestion for you. PLEASE NOTE THAT THE VALIDATION TEXT WILL BE REMOVED WHEN YOU FINALIZE YOUR NOTE. IF YOU WANT TO FAX A PRELIMINARY NOTE YOU WILL NEED TO TOGGLE THIS TO 'NO' IF YOU DO NOT WANT IT IN YOUR FAXED NOTE.

## 2021-09-10 NOTE — PROCEDURE: SUPERFICIAL RADIATION TREATMENT
Cumulative Dose In Cgy (Optional): 5082
Depth (Optional-Please Include Units): 2.17 mm
Custom Shielding Afterword Text Will Not Be Included With Simple Simulations (X X Y Cm............): port to correlate with the lesion size, including treatment margin. The custom lead shield is adequate to accommodate the appropriate applicator and provide adequate shielding around the treatment site. Additional shielding (as noted below) is used to protect sensitive, normal tissues.
Dose Per Fractionation In Cgy (Optional): 273
Bill For Simulation And Treatment Device Design: Yes
Total Number Of Fractions: 20
Additional Prescription Justification Text: If there is any interruption in treatment exceeding 5 days please see Decay and Dose Adjustment Calculation and complete treatment under Prescription 2, 3 or 4 as appropriate.
Bill For Treatment Devices Only: No
Treatment Margins In Cm: 1
Total Number Of Fractions Rx 4: 15
Port Dimensions-X Axis In Cm: 2.5
Patient Positioning: Sitting
Time Dose Fractionation (Optional- Include Units If Applicable): 97
Simple Simulation Preamble Text Will Be Included With Simple Simulations (.......... Indications): Simple simulation was performed today for the following reasons:
Field Size (Applicator): 3.0 cm
Fractions / Week Rx 2: 5
Energy (Optional-Please Include Units): 100 KV
Functional Status: 0 (fully active)
Custom Shielding Preamble Text Will Not Be Included With Simple Simulations (.......... X X Y Cm): A lead shield of 0.762 mm thickness is utilized to form a molded, custom shield with a
Treatment Time / Fractionation (Optional- Include Units): 0.42 min
Treatment Device Design After Initial Simulation Justification (Will Render If Bill For Treatment Devices = Yes): The patient is status post radiation simulation and is evaluated as to the use of additional devices for shielding and placement for radiation therapy.
Dimensions-Y Axis In Cm: 1.5
Detail Level: Zone
Fractions / Week: 3
Initial Radiation Treatment Planning (Will Render If Bill Simulation = Yes): The patient had a complete consultation regarding all applicable modalities for the treatment of their skin cancer and based on a variety of factors including the type of tumor, size, and location, the relevant medical history as well as local tissue factors, the functional status of the individual, the ability to perform necessary postoperative wound instructions and the need for simultaneous treatments as well as overall wound healing status, it was determined that the patient would begin radiation therapy treatment for skin cancer.  A full simulation and treatment device design was performed including the determination and formulation of appropriate simple and complex devices including lead shield of 0.762 mm thickness to form molded customized shielding to specifically correlate with the lesion size including treatment margin.  The custom lead shield is adequate to accommodate the appropriate applicator and provide adequate shielding around the treatment site.  The specific field applicator, shields, and devices both simple and complex as well as the specific patient setup is outlined below.  The patient was given a full consent for superficial radiation to both verbally and in writing and the full determination of patient's eligibility for treatment and selection is outlined on the patient eligibility and treatment selection form.  The specific superficial radiotherapy prescription was determined and was documented on the superficial radiotherapy prescription form.  A treatment calculation was also performed and documented on the treatment calculation form.  Based on the prescription, the patient was scheduled for a series of fractional treatments.
Pathology Override (Pathology Will Render As Diagnosis Name If Left Blank): Basal Supratip
Total Dose (Optional-Please Include Units): 5460 cGy
Shielding Size (Optional- Include Units): 2.5 cm
Intro Statement (Will Not Render If Left Blank): The patient is undergoing superficial radiation therapy for skin cancer and presents for weekly evaluation and management.  Per protocol and as documented on the flow sheet, the patient was questioned as to subjective redness, pruritus, pain, drainage, fatigue, or any other symptoms.  Objectively, the radiation area was evaluated with regards to erythema, atrophy, scale, crusting, erosion, ulceration, edema, purpura, tenderness, warmth, drainage, and any other findings.  The plan was extensively reviewed including the dose, and dosing schedule.  The simulation and clinical setup was also reviewed as was the external and any internal shields and based on this review the appropriateness and sufficiency of treatment was determined.
Daily Fractionated Dose (Optional- Include Units): 273 cGy
Day Of The Week Treatment Administered: Thursday
Fractionation Number: 12
Please Choose The Type Of Visit (Required): Treatment Visit: Show Treatment Variables
Dimensions-X Axis In Cm: 1.3
Body Location Override (Optional): Nasal Supratip
Information: Selecting Yes will display possible errors in your note based on the variables you have selected. This validation is only offered as a suggestion for you. PLEASE NOTE THAT THE VALIDATION TEXT WILL BE REMOVED WHEN YOU FINALIZE YOUR NOTE. IF YOU WANT TO FAX A PRELIMINARY NOTE YOU WILL NEED TO TOGGLE THIS TO 'NO' IF YOU DO NOT WANT IT IN YOUR FAXED NOTE.
Assessment: Appropriate reaction

## 2021-09-13 ENCOUNTER — APPOINTMENT (RX ONLY)
Dept: URBAN - METROPOLITAN AREA CLINIC 37 | Facility: CLINIC | Age: 86
Setting detail: DERMATOLOGY
End: 2021-09-13

## 2021-09-13 ENCOUNTER — APPOINTMENT (RX ONLY)
Dept: URBAN - METROPOLITAN AREA CLINIC 38 | Facility: CLINIC | Age: 86
Setting detail: DERMATOLOGY
End: 2021-09-13

## 2021-09-13 PROBLEM — C44.311 BASAL CELL CARCINOMA OF SKIN OF NOSE: Status: ACTIVE | Noted: 2021-09-13

## 2021-09-13 PROCEDURE — ? TREATMENT REGIMEN

## 2021-09-13 PROCEDURE — 77401: CPT

## 2021-09-13 PROCEDURE — ? SUPERFICIAL RADIATION TREATMENT

## 2021-09-13 PROCEDURE — 77280 THER RAD SIMULAJ FIELD SMPL: CPT

## 2021-09-13 PROCEDURE — G6001 ECHO GUIDANCE RADIOTHERAPY: HCPCS

## 2021-09-13 NOTE — PROCEDURE: SUPERFICIAL RADIATION TREATMENT
Body Location Override (Optional): Nasal Supratip
Detail Level: Zone
Pathology Override (Pathology Will Render As Diagnosis Name If Left Blank): Basal Supratip
Field Number: 1
Dimensions-X Axis In Cm: 1.3
Dimensions-Y Axis In Cm: 1.5
Render Patient Eligibility And Selection In Note?: Yes
Render Additional Prescriptions In Note?: No
Please Choose The Type Of Visit (Required): Treatment Visit: Show Treatment Variables
Functional Status: 0 (fully active)
Port Dimensions-X Axis In Cm: 2.5
Field Size (Applicator): 3.0 cm
Patient Positioning: Sitting
Custom Shielding Preamble Text Will Not Be Included With Simple Simulations (.......... X X Y Cm): A lead shield of 0.762 mm thickness is utilized to form a molded, custom shield with a
Custom Shielding Afterword Text Will Not Be Included With Simple Simulations (X X Y Cm............): port to correlate with the lesion size, including treatment margin. The custom lead shield is adequate to accommodate the appropriate applicator and provide adequate shielding around the treatment site. Additional shielding (as noted below) is used to protect sensitive, normal tissues.
Initial Radiation Treatment Planning (Will Render If Bill Simulation = Yes): The patient had a complete consultation regarding all applicable modalities for the treatment of their skin cancer and based on a variety of factors including the type of tumor, size, and location, the relevant medical history as well as local tissue factors, the functional status of the individual, the ability to perform necessary postoperative wound instructions and the need for simultaneous treatments as well as overall wound healing status, it was determined that the patient would begin radiation therapy treatment for skin cancer.  A full simulation and treatment device design was performed including the determination and formulation of appropriate simple and complex devices including lead shield of 0.762 mm thickness to form molded customized shielding to specifically correlate with the lesion size including treatment margin.  The custom lead shield is adequate to accommodate the appropriate applicator and provide adequate shielding around the treatment site.  The specific field applicator, shields, and devices both simple and complex as well as the specific patient setup is outlined below.  The patient was given a full consent for superficial radiation to both verbally and in writing and the full determination of patient's eligibility for treatment and selection is outlined on the patient eligibility and treatment selection form.  The specific superficial radiotherapy prescription was determined and was documented on the superficial radiotherapy prescription form.  A treatment calculation was also performed and documented on the treatment calculation form.  Based on the prescription, the patient was scheduled for a series of fractional treatments.
Simple Simulation Preamble Text Will Be Included With Simple Simulations (.......... Indications): Simple simulation was performed today for the following reasons:
Treatment Device Design After Initial Simulation Justification (Will Render If Bill For Treatment Devices = Yes): The patient is status post radiation simulation and is evaluated as to the use of additional devices for shielding and placement for radiation therapy.
Additional Prescription Justification Text: If there is any interruption in treatment exceeding 5 days please see Decay and Dose Adjustment Calculation and complete treatment under Prescription 2, 3 or 4 as appropriate.
Shielding Size (Optional- Include Units): 2.5 cm
Energy (Optional-Please Include Units): 100 kv
Depth (Optional-Please Include Units): 2.17 mm
Fractions / Week: 3
Time Dose Fractionation (Optional- Include Units If Applicable): 97
Daily Fractionated Dose (Optional- Include Units): 273 cGy
Treatment Time / Fractionation (Optional- Include Units): 0.42 min
Total Number Of Fractions: 20
Total Dose (Optional-Please Include Units): 5460 cGy
Fractions / Week Rx 2: 5
Total Number Of Fractions Rx 2: 15
Dose Per Fractionation In Cgy (Optional): 273
Day Of The Week Treatment Administered: Monday
Fractionation Number: 13
Cumulative Dose In Cgy (Optional): 9777
Assessment: Appropriate reaction
Intro Statement (Will Not Render If Left Blank): The patient is undergoing superficial radiation therapy for skin cancer and presents for weekly evaluation and management.  Per protocol and as documented on the flow sheet, the patient was questioned as to subjective redness, pruritus, pain, drainage, fatigue, or any other symptoms.  Objectively, the radiation area was evaluated with regards to erythema, atrophy, scale, crusting, erosion, ulceration, edema, purpura, tenderness, warmth, drainage, and any other findings.  The plan was extensively reviewed including the dose, and dosing schedule.  The simulation and clinical setup was also reviewed as was the external and any internal shields and based on this review the appropriateness and sufficiency of treatment was determined.
Information: Selecting Yes will display possible errors in your note based on the variables you have selected. This validation is only offered as a suggestion for you. PLEASE NOTE THAT THE VALIDATION TEXT WILL BE REMOVED WHEN YOU FINALIZE YOUR NOTE. IF YOU WANT TO FAX A PRELIMINARY NOTE YOU WILL NEED TO TOGGLE THIS TO 'NO' IF YOU DO NOT WANT IT IN YOUR FAXED NOTE.

## 2021-09-13 NOTE — PROCEDURE: SUPERFICIAL RADIATION TREATMENT
Simple Simulation Afterword Text Will Be Included With Simple Simulations (Indications............): The patient had a complete consultation regarding all applicable modalities for the treatment of their skin cancer and based on a variety of factors including the type of tumor, size, and location, the relevant medical history as well as local tissue factors, the functional status of the individual, the ability to perform necessary postoperative wound instructions and the need for simultaneous treatments as well as overall wound healing status, it was determined that the patient would begin radiation therapy treatment for skin cancer.  A full simulation and treatment device design was performed including the determination and formulation of appropriate simple and complex devices including lead shield of 0.762 mm thickness to form molded customized shielding to specifically correlate with the lesion size including treatment margin.  The custom lead shield is adequate to accommodate the appropriate applicator and provide adequate shielding around the treatment site.  The specific field applicator, shields, and devices both simple and complex as well as the specific patient setup is outlined below.  The patient was given a full consent for superficial radiation to both verbally and in writing and the full determination of patient's eligibility for treatment and selection is outlined on the patient eligibility and treatment selection form.  The specific superficial radiotherapy prescription was determined and was documented on the superficial radiotherapy prescription form.  A treatment calculation was also performed and documented on the treatment calculation form.  Based on the prescription, the patient was scheduled for a series of fractional treatments.
Pathology Override (Pathology Will Render As Diagnosis Name If Left Blank): Basal Supratip
Cumulative Dose In Cgy (Optional): 9693
Total Number Of Fractions Rx 4: 15
Total Number Of Fractions: 20
Depth (Optional-Please Include Units): 2.17 mm
Render Prescriptions In Note?: Yes
Custom Shielding Afterword Text Will Not Be Included With Simple Simulations (X X Y Cm............): port to correlate with the lesion size, including treatment margin. The custom lead shield is adequate to accommodate the appropriate applicator and provide adequate shielding around the treatment site. Additional shielding (as noted below) is used to protect sensitive, normal tissues.
Number Of Days Off Treatment: 1
Port Dimensions-X Axis In Cm: 2.5
Patient Positioning: Sitting
Time Dose Fractionation (Optional- Include Units If Applicable): 97
Render Additional Prescriptions In Note?: No
Field Size (Applicator): 3.0 cm
Please Choose The Type Of Visit (Required): Treatment Visit: Show Treatment Variables
Additional Prescription Justification Text: If there is any interruption in treatment exceeding 5 days please see Decay and Dose Adjustment Calculation and complete treatment under Prescription 2, 3 or 4 as appropriate.
Dimensions-X Axis In Cm: 1.3
Fractions / Week Rx 2: 5
Energy (Optional-Please Include Units): 100 KV
Body Location Override (Optional): Nasal Supratip
Simple Simulation Preamble Text Will Be Included With Simple Simulations (.......... Indications): Simple simulation was performed today for the following reasons:
Dose / Tx In Cgy (Optional): 273
Treatment Time / Fractionation (Optional- Include Units): 0.42 min
Custom Shielding Preamble Text Will Not Be Included With Simple Simulations (.......... X X Y Cm): A lead shield of 0.762 mm thickness is utilized to form a molded, custom shield with a
Fractions / Week: 3
Treatment Device Design After Initial Simulation Justification (Will Render If Bill For Treatment Devices = Yes): The patient is status post radiation simulation and is evaluated as to the use of additional devices for shielding and placement for radiation therapy.
Functional Status: 0 (fully active)
Total Dose (Optional-Please Include Units): 5460 cGy
Intro Statement (Will Not Render If Left Blank): The patient is undergoing superficial radiation therapy for skin cancer and presents for weekly evaluation and management.  Per protocol and as documented on the flow sheet, the patient was questioned as to subjective redness, pruritus, pain, drainage, fatigue, or any other symptoms.  Objectively, the radiation area was evaluated with regards to erythema, atrophy, scale, crusting, erosion, ulceration, edema, purpura, tenderness, warmth, drainage, and any other findings.  The plan was extensively reviewed including the dose, and dosing schedule.  The simulation and clinical setup was also reviewed as was the external and any internal shields and based on this review the appropriateness and sufficiency of treatment was determined.
Daily Fractionated Dose (Optional- Include Units): 273 cGy
Fractionation Number: 13
Day Of The Week Treatment Administered: Monday
Shielding Size (Optional- Include Units): 2.5 cm
Dimensions-Y Axis In Cm: 1.5
Detail Level: Zone
Information: Selecting Yes will display possible errors in your note based on the variables you have selected. This validation is only offered as a suggestion for you. PLEASE NOTE THAT THE VALIDATION TEXT WILL BE REMOVED WHEN YOU FINALIZE YOUR NOTE. IF YOU WANT TO FAX A PRELIMINARY NOTE YOU WILL NEED TO TOGGLE THIS TO 'NO' IF YOU DO NOT WANT IT IN YOUR FAXED NOTE.
Assessment: Appropriate reaction

## 2021-09-16 ENCOUNTER — APPOINTMENT (RX ONLY)
Dept: URBAN - METROPOLITAN AREA CLINIC 38 | Facility: CLINIC | Age: 86
Setting detail: DERMATOLOGY
End: 2021-09-16

## 2021-09-16 ENCOUNTER — APPOINTMENT (RX ONLY)
Dept: URBAN - METROPOLITAN AREA CLINIC 37 | Facility: CLINIC | Age: 86
Setting detail: DERMATOLOGY
End: 2021-09-16

## 2021-09-16 PROBLEM — C44.311 BASAL CELL CARCINOMA OF SKIN OF NOSE: Status: ACTIVE | Noted: 2021-09-16

## 2021-09-16 PROCEDURE — 77280 THER RAD SIMULAJ FIELD SMPL: CPT

## 2021-09-16 PROCEDURE — 77401: CPT

## 2021-09-16 PROCEDURE — ? TREATMENT REGIMEN

## 2021-09-16 PROCEDURE — ? SUPERFICIAL RADIATION TREATMENT

## 2021-09-16 PROCEDURE — G6001 ECHO GUIDANCE RADIOTHERAPY: HCPCS

## 2021-09-16 PROCEDURE — 77427 RADIATION TX MANAGEMENT X5: CPT

## 2021-09-16 NOTE — PROCEDURE: SUPERFICIAL RADIATION TREATMENT
Body Location Override (Optional): Nasal Supratip
Detail Level: Zone
Pathology Override (Pathology Will Render As Diagnosis Name If Left Blank): Basal Supratip
Field Number: 1
Dimensions-X Axis In Cm: 1.3
Dimensions-Y Axis In Cm: 1.5
Render Patient Eligibility And Selection In Note?: Yes
Render Additional Prescriptions In Note?: No
Please Choose The Type Of Visit (Required): Treatment Visit: Show Treatment Variables
Functional Status: 0 (fully active)
Port Dimensions-X Axis In Cm: 2.5
Field Size (Applicator): 3.0 cm
Patient Positioning: Sitting
Custom Shielding Preamble Text Will Not Be Included With Simple Simulations (.......... X X Y Cm): A lead shield of 0.762 mm thickness is utilized to form a molded, custom shield with a
Custom Shielding Afterword Text Will Not Be Included With Simple Simulations (X X Y Cm............): port to correlate with the lesion size, including treatment margin. The custom lead shield is adequate to accommodate the appropriate applicator and provide adequate shielding around the treatment site. Additional shielding (as noted below) is used to protect sensitive, normal tissues.
Initial Radiation Treatment Planning (Will Render If Bill Simulation = Yes): The patient had a complete consultation regarding all applicable modalities for the treatment of their skin cancer and based on a variety of factors including the type of tumor, size, and location, the relevant medical history as well as local tissue factors, the functional status of the individual, the ability to perform necessary postoperative wound instructions and the need for simultaneous treatments as well as overall wound healing status, it was determined that the patient would begin radiation therapy treatment for skin cancer.  A full simulation and treatment device design was performed including the determination and formulation of appropriate simple and complex devices including lead shield of 0.762 mm thickness to form molded customized shielding to specifically correlate with the lesion size including treatment margin.  The custom lead shield is adequate to accommodate the appropriate applicator and provide adequate shielding around the treatment site.  The specific field applicator, shields, and devices both simple and complex as well as the specific patient setup is outlined below.  The patient was given a full consent for superficial radiation to both verbally and in writing and the full determination of patient's eligibility for treatment and selection is outlined on the patient eligibility and treatment selection form.  The specific superficial radiotherapy prescription was determined and was documented on the superficial radiotherapy prescription form.  A treatment calculation was also performed and documented on the treatment calculation form.  Based on the prescription, the patient was scheduled for a series of fractional treatments.
Simple Simulation Preamble Text Will Be Included With Simple Simulations (.......... Indications): Simple simulation was performed today for the following reasons:
Treatment Device Design After Initial Simulation Justification (Will Render If Bill For Treatment Devices = Yes): The patient is status post radiation simulation and is evaluated as to the use of additional devices for shielding and placement for radiation therapy.
Additional Prescription Justification Text: If there is any interruption in treatment exceeding 5 days please see Decay and Dose Adjustment Calculation and complete treatment under Prescription 2, 3 or 4 as appropriate.
Shielding Size (Optional- Include Units): 2.5 cm
Energy (Optional-Please Include Units): 100 kv
Depth (Optional-Please Include Units): 2.17 mm
Fractions / Week: 3
Time Dose Fractionation (Optional- Include Units If Applicable): 97
Daily Fractionated Dose (Optional- Include Units): 273 cGy
Treatment Time / Fractionation (Optional- Include Units): 0.42 min
Total Number Of Fractions: 20
Total Dose (Optional-Please Include Units): 5460 cGy
Fractions / Week Rx 2: 5
Total Number Of Fractions Rx 2: 15
Dose Per Fractionation In Cgy (Optional): 273
Day Of The Week Treatment Administered: Wednesday
Fractionation Number: 14
Cumulative Dose In Cgy (Optional): 7131
Assessment: Appropriate reaction
Intro Statement (Will Not Render If Left Blank): The patient is undergoing superficial radiation therapy for skin cancer and presents for weekly evaluation and management.  Per protocol and as documented on the flow sheet, the patient was questioned as to subjective redness, pruritus, pain, drainage, fatigue, or any other symptoms.  Objectively, the radiation area was evaluated with regards to erythema, atrophy, scale, crusting, erosion, ulceration, edema, purpura, tenderness, warmth, drainage, and any other findings.  The plan was extensively reviewed including the dose, and dosing schedule.  The simulation and clinical setup was also reviewed as was the external and any internal shields and based on this review the appropriateness and sufficiency of treatment was determined.
Information: Selecting Yes will display possible errors in your note based on the variables you have selected. This validation is only offered as a suggestion for you. PLEASE NOTE THAT THE VALIDATION TEXT WILL BE REMOVED WHEN YOU FINALIZE YOUR NOTE. IF YOU WANT TO FAX A PRELIMINARY NOTE YOU WILL NEED TO TOGGLE THIS TO 'NO' IF YOU DO NOT WANT IT IN YOUR FAXED NOTE.

## 2021-09-16 NOTE — PROCEDURE: SUPERFICIAL RADIATION TREATMENT
Body Location Override (Optional): Nasal Supratip
Detail Level: Zone
Pathology Override (Pathology Will Render As Diagnosis Name If Left Blank): Basal Supratip
Field Number: 1
Dimensions-X Axis In Cm: 1.3
Dimensions-Y Axis In Cm: 1.5
Render Patient Eligibility And Selection In Note?: Yes
Render Additional Prescriptions In Note?: No
Please Choose The Type Of Visit (Required): Weekly Evaluation Visit: Show Weekly Evaluation Variables
Functional Status: 0 (fully active)
Port Dimensions-X Axis In Cm: 2.5
Field Size (Applicator): 3.0 cm
Patient Positioning: Sitting
Custom Shielding Preamble Text Will Not Be Included With Simple Simulations (.......... X X Y Cm): A lead shield of 0.762 mm thickness is utilized to form a molded, custom shield with a
Custom Shielding Afterword Text Will Not Be Included With Simple Simulations (X X Y Cm............): port to correlate with the lesion size, including treatment margin. The custom lead shield is adequate to accommodate the appropriate applicator and provide adequate shielding around the treatment site. Additional shielding (as noted below) is used to protect sensitive, normal tissues.
Initial Radiation Treatment Planning (Will Render If Bill Simulation = Yes): The patient had a complete consultation regarding all applicable modalities for the treatment of their skin cancer and based on a variety of factors including the type of tumor, size, and location, the relevant medical history as well as local tissue factors, the functional status of the individual, the ability to perform necessary postoperative wound instructions and the need for simultaneous treatments as well as overall wound healing status, it was determined that the patient would begin radiation therapy treatment for skin cancer.  A full simulation and treatment device design was performed including the determination and formulation of appropriate simple and complex devices including lead shield of 0.762 mm thickness to form molded customized shielding to specifically correlate with the lesion size including treatment margin.  The custom lead shield is adequate to accommodate the appropriate applicator and provide adequate shielding around the treatment site.  The specific field applicator, shields, and devices both simple and complex as well as the specific patient setup is outlined below.  The patient was given a full consent for superficial radiation to both verbally and in writing and the full determination of patient's eligibility for treatment and selection is outlined on the patient eligibility and treatment selection form.  The specific superficial radiotherapy prescription was determined and was documented on the superficial radiotherapy prescription form.  A treatment calculation was also performed and documented on the treatment calculation form.  Based on the prescription, the patient was scheduled for a series of fractional treatments.
Simple Simulation Preamble Text Will Be Included With Simple Simulations (.......... Indications): Simple simulation was performed today for the following reasons:
Treatment Device Design After Initial Simulation Justification (Will Render If Bill For Treatment Devices = Yes): The patient is status post radiation simulation and is evaluated as to the use of additional devices for shielding and placement for radiation therapy.
Additional Prescription Justification Text: If there is any interruption in treatment exceeding 5 days please see Decay and Dose Adjustment Calculation and complete treatment under Prescription 2, 3 or 4 as appropriate.
Shielding Size (Optional- Include Units): 2.5 cm
Energy (Optional-Please Include Units): 100 kv
Depth (Optional-Please Include Units): 2.17 mm
Fractions / Week: 3
Time Dose Fractionation (Optional- Include Units If Applicable): 97
Daily Fractionated Dose (Optional- Include Units): 273 cGy
Treatment Time / Fractionation (Optional- Include Units): 0.42 min
Total Number Of Fractions: 20
Total Dose (Optional-Please Include Units): 5460 cGy
Fractions / Week Rx 2: 5
Total Number Of Fractions Rx 2: 15
Dose Per Fractionation In Cgy (Optional): 273
Day Of The Week Treatment Administered: Thursday
Cumulative Dose In Cgy (Optional): 8337
Assessment: Appropriate reaction
Intro Statement (Will Not Render If Left Blank): The patient is undergoing superficial radiation therapy for skin cancer and presents for weekly evaluation and management.  Per protocol and as documented on the flow sheet, the patient was questioned as to subjective redness, pruritus, pain, drainage, fatigue, or any other symptoms.  Objectively, the radiation area was evaluated with regards to erythema, atrophy, scale, crusting, erosion, ulceration, edema, purpura, tenderness, warmth, drainage, and any other findings.  The plan was extensively reviewed including the dose, and dosing schedule.  The simulation and clinical setup was also reviewed as was the external and any internal shields and based on this review the appropriateness and sufficiency of treatment was determined.
Information: Selecting Yes will display possible errors in your note based on the variables you have selected. This validation is only offered as a suggestion for you. PLEASE NOTE THAT THE VALIDATION TEXT WILL BE REMOVED WHEN YOU FINALIZE YOUR NOTE. IF YOU WANT TO FAX A PRELIMINARY NOTE YOU WILL NEED TO TOGGLE THIS TO 'NO' IF YOU DO NOT WANT IT IN YOUR FAXED NOTE.

## 2021-09-16 NOTE — PROCEDURE: SUPERFICIAL RADIATION TREATMENT
Render Text From Evaluation And Management Tab (Will Not Bill [id number]): No
Fractions / Week Rx 4: 5
Energy (Optional-Please Include Units): 100 kv
Custom Shielding Preamble Text Will Not Be Included With Simple Simulations (.......... X X Y Cm): A lead shield of 0.762 mm thickness is utilized to form a molded, custom shield with a
Treatment Time / Fractionation (Optional- Include Units): 0.42 min
Treatment Device Design After Initial Simulation Justification (Will Render If Bill For Treatment Devices = Yes): The patient is status post radiation simulation and is evaluated as to the use of additional devices for shielding and placement for radiation therapy.
Field Number: 1
Functional Status: 0 (fully active)
Port Dimensions-Y Axis In Cm: 2.5
Fractions / Week: 3
Initial Radiation Treatment Planning (Will Render If Bill Simulation = Yes): The patient had a complete consultation regarding all applicable modalities for the treatment of their skin cancer and based on a variety of factors including the type of tumor, size, and location, the relevant medical history as well as local tissue factors, the functional status of the individual, the ability to perform necessary postoperative wound instructions and the need for simultaneous treatments as well as overall wound healing status, it was determined that the patient would begin radiation therapy treatment for skin cancer.  A full simulation and treatment device design was performed including the determination and formulation of appropriate simple and complex devices including lead shield of 0.762 mm thickness to form molded customized shielding to specifically correlate with the lesion size including treatment margin.  The custom lead shield is adequate to accommodate the appropriate applicator and provide adequate shielding around the treatment site.  The specific field applicator, shields, and devices both simple and complex as well as the specific patient setup is outlined below.  The patient was given a full consent for superficial radiation to both verbally and in writing and the full determination of patient's eligibility for treatment and selection is outlined on the patient eligibility and treatment selection form.  The specific superficial radiotherapy prescription was determined and was documented on the superficial radiotherapy prescription form.  A treatment calculation was also performed and documented on the treatment calculation form.  Based on the prescription, the patient was scheduled for a series of fractional treatments.
Intro Statement (Will Not Render If Left Blank): The patient is undergoing superficial radiation therapy for skin cancer and presents for weekly evaluation and management.  Per protocol and as documented on the flow sheet, the patient was questioned as to subjective redness, pruritus, pain, drainage, fatigue, or any other symptoms.  Objectively, the radiation area was evaluated with regards to erythema, atrophy, scale, crusting, erosion, ulceration, edema, purpura, tenderness, warmth, drainage, and any other findings.  The plan was extensively reviewed including the dose, and dosing schedule.  The simulation and clinical setup was also reviewed as was the external and any internal shields and based on this review the appropriateness and sufficiency of treatment was determined.
Day Of The Week Treatment Administered: Thursday
Dimensions-Y Axis In Cm: 1.5
Field Size (Applicator): 3.0 cm
Validate Note Data (See Information Below): Yes
Assessment: Appropriate reaction
Fractionation Number: 15
Total Dose (Optional-Please Include Units): 5460 cGy
Dose Per Fractionation In Cgy (Optional): 273
Shielding Size (Optional- Include Units): 2.5 cm
Information: Selecting Yes will display possible errors in your note based on the variables you have selected. This validation is only offered as a suggestion for you. PLEASE NOTE THAT THE VALIDATION TEXT WILL BE REMOVED WHEN YOU FINALIZE YOUR NOTE. IF YOU WANT TO FAX A PRELIMINARY NOTE YOU WILL NEED TO TOGGLE THIS TO 'NO' IF YOU DO NOT WANT IT IN YOUR FAXED NOTE.
Daily Fractionated Dose (Optional- Include Units): 273 cGy
Pathology Override (Pathology Will Render As Diagnosis Name If Left Blank): Basal Supratip
Detail Level: Zone
Cumulative Dose In Cgy (Optional): 0620
Depth (Optional-Please Include Units): 2.17 mm
Custom Shielding Afterword Text Will Not Be Included With Simple Simulations (X X Y Cm............): port to correlate with the lesion size, including treatment margin. The custom lead shield is adequate to accommodate the appropriate applicator and provide adequate shielding around the treatment site. Additional shielding (as noted below) is used to protect sensitive, normal tissues.
Please Choose The Type Of Visit (Required): Weekly Evaluation Visit: Show Weekly Evaluation Variables
Total Number Of Fractions: 20
Dimensions-X Axis In Cm: 1.3
Additional Prescription Justification Text: If there is any interruption in treatment exceeding 5 days please see Decay and Dose Adjustment Calculation and complete treatment under Prescription 2, 3 or 4 as appropriate.
Patient Positioning: Sitting
Time Dose Fractionation (Optional- Include Units If Applicable): 97
Simple Simulation Preamble Text Will Be Included With Simple Simulations (.......... Indications): Simple simulation was performed today for the following reasons:
Body Location Override (Optional): Nasal Supratip

## 2021-09-16 NOTE — PROCEDURE: SUPERFICIAL RADIATION TREATMENT
Day Of The Week Treatment Administered: Wednesday
Fractions / Week: 3
Initial Radiation Treatment Planning (Will Render If Bill Simulation = Yes): The patient had a complete consultation regarding all applicable modalities for the treatment of their skin cancer and based on a variety of factors including the type of tumor, size, and location, the relevant medical history as well as local tissue factors, the functional status of the individual, the ability to perform necessary postoperative wound instructions and the need for simultaneous treatments as well as overall wound healing status, it was determined that the patient would begin radiation therapy treatment for skin cancer.  A full simulation and treatment device design was performed including the determination and formulation of appropriate simple and complex devices including lead shield of 0.762 mm thickness to form molded customized shielding to specifically correlate with the lesion size including treatment margin.  The custom lead shield is adequate to accommodate the appropriate applicator and provide adequate shielding around the treatment site.  The specific field applicator, shields, and devices both simple and complex as well as the specific patient setup is outlined below.  The patient was given a full consent for superficial radiation to both verbally and in writing and the full determination of patient's eligibility for treatment and selection is outlined on the patient eligibility and treatment selection form.  The specific superficial radiotherapy prescription was determined and was documented on the superficial radiotherapy prescription form.  A treatment calculation was also performed and documented on the treatment calculation form.  Based on the prescription, the patient was scheduled for a series of fractional treatments.
Fractionation Number: 14
Total Dose (Optional-Please Include Units): 5460 cGy
Field Size (Applicator): 3.0 cm
Dimensions-Y Axis In Cm: 1.5
Validate Note Data (See Information Below): Yes
Intro Statement (Will Not Render If Left Blank): The patient is undergoing superficial radiation therapy for skin cancer and presents for weekly evaluation and management.  Per protocol and as documented on the flow sheet, the patient was questioned as to subjective redness, pruritus, pain, drainage, fatigue, or any other symptoms.  Objectively, the radiation area was evaluated with regards to erythema, atrophy, scale, crusting, erosion, ulceration, edema, purpura, tenderness, warmth, drainage, and any other findings.  The plan was extensively reviewed including the dose, and dosing schedule.  The simulation and clinical setup was also reviewed as was the external and any internal shields and based on this review the appropriateness and sufficiency of treatment was determined.
Fractions / Week Rx 3: 5
Shielding Size (Optional- Include Units): 2.5 cm
Information: Selecting Yes will display possible errors in your note based on the variables you have selected. This validation is only offered as a suggestion for you. PLEASE NOTE THAT THE VALIDATION TEXT WILL BE REMOVED WHEN YOU FINALIZE YOUR NOTE. IF YOU WANT TO FAX A PRELIMINARY NOTE YOU WILL NEED TO TOGGLE THIS TO 'NO' IF YOU DO NOT WANT IT IN YOUR FAXED NOTE.
Assessment: Appropriate reaction
Cumulative Dose In Cgy (Optional): 8965
Dose Per Fractionation In Cgy (Optional): 273
Include Rx 3 When Rendering Additional Prescriptions: No
Detail Level: Zone
Total Number Of Fractions Rx 4: 15
Daily Fractionated Dose (Optional- Include Units): 273 cGy
Pathology Override (Pathology Will Render As Diagnosis Name If Left Blank): Basal Supratip
Custom Shielding Afterword Text Will Not Be Included With Simple Simulations (X X Y Cm............): port to correlate with the lesion size, including treatment margin. The custom lead shield is adequate to accommodate the appropriate applicator and provide adequate shielding around the treatment site. Additional shielding (as noted below) is used to protect sensitive, normal tissues.
Number Of Days Off Treatment: 1
Port Dimensions-X Axis In Cm: 2.5
Depth (Optional-Please Include Units): 2.17 mm
Total Number Of Fractions: 20
Please Choose The Type Of Visit (Required): Treatment Visit: Show Treatment Variables
Dimensions-X Axis In Cm: 1.3
Patient Positioning: Sitting
Additional Prescription Justification Text: If there is any interruption in treatment exceeding 5 days please see Decay and Dose Adjustment Calculation and complete treatment under Prescription 2, 3 or 4 as appropriate.
Body Location Override (Optional): Nasal Supratip
Energy (Optional-Please Include Units): 100 KV
Time Dose Fractionation (Optional- Include Units If Applicable): 97
Simple Simulation Preamble Text Will Be Included With Simple Simulations (.......... Indications): Simple simulation was performed today for the following reasons:
Custom Shielding Preamble Text Will Not Be Included With Simple Simulations (.......... X X Y Cm): A lead shield of 0.762 mm thickness is utilized to form a molded, custom shield with a
Treatment Time / Fractionation (Optional- Include Units): 0.42 min
Treatment Device Design After Initial Simulation Justification (Will Render If Bill For Treatment Devices = Yes): The patient is status post radiation simulation and is evaluated as to the use of additional devices for shielding and placement for radiation therapy.
Functional Status: 0 (fully active)

## 2021-09-20 ENCOUNTER — APPOINTMENT (RX ONLY)
Dept: URBAN - METROPOLITAN AREA CLINIC 37 | Facility: CLINIC | Age: 86
Setting detail: DERMATOLOGY
End: 2021-09-20

## 2021-09-20 ENCOUNTER — APPOINTMENT (RX ONLY)
Dept: URBAN - METROPOLITAN AREA CLINIC 38 | Facility: CLINIC | Age: 86
Setting detail: DERMATOLOGY
End: 2021-09-20

## 2021-09-20 PROBLEM — C44.311 BASAL CELL CARCINOMA OF SKIN OF NOSE: Status: ACTIVE | Noted: 2021-09-20

## 2021-09-20 PROCEDURE — 77401: CPT

## 2021-09-20 PROCEDURE — ? SUPERFICIAL RADIATION TREATMENT

## 2021-09-20 PROCEDURE — ? TREATMENT REGIMEN

## 2021-09-20 PROCEDURE — G6001 ECHO GUIDANCE RADIOTHERAPY: HCPCS

## 2021-09-20 PROCEDURE — 77280 THER RAD SIMULAJ FIELD SMPL: CPT

## 2021-09-20 NOTE — PROCEDURE: SUPERFICIAL RADIATION TREATMENT
Body Location Override (Optional): Nasal Supratip
Detail Level: Zone
Pathology Override (Pathology Will Render As Diagnosis Name If Left Blank): Basal Supratip
Field Number: 1
Dimensions-X Axis In Cm: 1.3
Dimensions-Y Axis In Cm: 1.5
Render Patient Eligibility And Selection In Note?: Yes
Render Additional Prescriptions In Note?: No
Please Choose The Type Of Visit (Required): Weekly Evaluation Visit: Show Weekly Evaluation Variables
Functional Status: 0 (fully active)
Port Dimensions-X Axis In Cm: 2.5
Field Size (Applicator): 3.0 cm
Patient Positioning: Sitting
Custom Shielding Preamble Text Will Not Be Included With Simple Simulations (.......... X X Y Cm): A lead shield of 0.762 mm thickness is utilized to form a molded, custom shield with a
Custom Shielding Afterword Text Will Not Be Included With Simple Simulations (X X Y Cm............): port to correlate with the lesion size, including treatment margin. The custom lead shield is adequate to accommodate the appropriate applicator and provide adequate shielding around the treatment site. Additional shielding (as noted below) is used to protect sensitive, normal tissues.
Initial Radiation Treatment Planning (Will Render If Bill Simulation = Yes): The patient had a complete consultation regarding all applicable modalities for the treatment of their skin cancer and based on a variety of factors including the type of tumor, size, and location, the relevant medical history as well as local tissue factors, the functional status of the individual, the ability to perform necessary postoperative wound instructions and the need for simultaneous treatments as well as overall wound healing status, it was determined that the patient would begin radiation therapy treatment for skin cancer.  A full simulation and treatment device design was performed including the determination and formulation of appropriate simple and complex devices including lead shield of 0.762 mm thickness to form molded customized shielding to specifically correlate with the lesion size including treatment margin.  The custom lead shield is adequate to accommodate the appropriate applicator and provide adequate shielding around the treatment site.  The specific field applicator, shields, and devices both simple and complex as well as the specific patient setup is outlined below.  The patient was given a full consent for superficial radiation to both verbally and in writing and the full determination of patient's eligibility for treatment and selection is outlined on the patient eligibility and treatment selection form.  The specific superficial radiotherapy prescription was determined and was documented on the superficial radiotherapy prescription form.  A treatment calculation was also performed and documented on the treatment calculation form.  Based on the prescription, the patient was scheduled for a series of fractional treatments.
Simple Simulation Preamble Text Will Be Included With Simple Simulations (.......... Indications): Simple simulation was performed today for the following reasons:
Treatment Device Design After Initial Simulation Justification (Will Render If Bill For Treatment Devices = Yes): The patient is status post radiation simulation and is evaluated as to the use of additional devices for shielding and placement for radiation therapy.
Additional Prescription Justification Text: If there is any interruption in treatment exceeding 5 days please see Decay and Dose Adjustment Calculation and complete treatment under Prescription 2, 3 or 4 as appropriate.
Shielding Size (Optional- Include Units): 2.5 cm
Energy (Optional-Please Include Units): 100 kv
Depth (Optional-Please Include Units): 2.17 mm
Fractions / Week: 3
Time Dose Fractionation (Optional- Include Units If Applicable): 97
Daily Fractionated Dose (Optional- Include Units): 273 cGy
Treatment Time / Fractionation (Optional- Include Units): 0.42 min
Total Number Of Fractions: 20
Total Dose (Optional-Please Include Units): 5460 cGy
Fractions / Week Rx 2: 5
Total Number Of Fractions Rx 2: 15
Dose Per Fractionation In Cgy (Optional): 273
Day Of The Week Treatment Administered: Monday
Fractionation Number: 16
Cumulative Dose In Cgy (Optional): 4369
Assessment: Appropriate reaction
Intro Statement (Will Not Render If Left Blank): The patient is undergoing superficial radiation therapy for skin cancer and presents for weekly evaluation and management.  Per protocol and as documented on the flow sheet, the patient was questioned as to subjective redness, pruritus, pain, drainage, fatigue, or any other symptoms.  Objectively, the radiation area was evaluated with regards to erythema, atrophy, scale, crusting, erosion, ulceration, edema, purpura, tenderness, warmth, drainage, and any other findings.  The plan was extensively reviewed including the dose, and dosing schedule.  The simulation and clinical setup was also reviewed as was the external and any internal shields and based on this review the appropriateness and sufficiency of treatment was determined.
Information: Selecting Yes will display possible errors in your note based on the variables you have selected. This validation is only offered as a suggestion for you. PLEASE NOTE THAT THE VALIDATION TEXT WILL BE REMOVED WHEN YOU FINALIZE YOUR NOTE. IF YOU WANT TO FAX A PRELIMINARY NOTE YOU WILL NEED TO TOGGLE THIS TO 'NO' IF YOU DO NOT WANT IT IN YOUR FAXED NOTE.

## 2021-09-20 NOTE — PROCEDURE: SUPERFICIAL RADIATION TREATMENT
Port Dimensions-Y Axis In Cm: 2.5
Initial Radiation Treatment Planning (Will Render If Bill Simulation = Yes): The patient had a complete consultation regarding all applicable modalities for the treatment of their skin cancer and based on a variety of factors including the type of tumor, size, and location, the relevant medical history as well as local tissue factors, the functional status of the individual, the ability to perform necessary postoperative wound instructions and the need for simultaneous treatments as well as overall wound healing status, it was determined that the patient would begin radiation therapy treatment for skin cancer.  A full simulation and treatment device design was performed including the determination and formulation of appropriate simple and complex devices including lead shield of 0.762 mm thickness to form molded customized shielding to specifically correlate with the lesion size including treatment margin.  The custom lead shield is adequate to accommodate the appropriate applicator and provide adequate shielding around the treatment site.  The specific field applicator, shields, and devices both simple and complex as well as the specific patient setup is outlined below.  The patient was given a full consent for superficial radiation to both verbally and in writing and the full determination of patient's eligibility for treatment and selection is outlined on the patient eligibility and treatment selection form.  The specific superficial radiotherapy prescription was determined and was documented on the superficial radiotherapy prescription form.  A treatment calculation was also performed and documented on the treatment calculation form.  Based on the prescription, the patient was scheduled for a series of fractional treatments.
Intro Statement (Will Not Render If Left Blank): The patient is undergoing superficial radiation therapy for skin cancer and presents for weekly evaluation and management.  Per protocol and as documented on the flow sheet, the patient was questioned as to subjective redness, pruritus, pain, drainage, fatigue, or any other symptoms.  Objectively, the radiation area was evaluated with regards to erythema, atrophy, scale, crusting, erosion, ulceration, edema, purpura, tenderness, warmth, drainage, and any other findings.  The plan was extensively reviewed including the dose, and dosing schedule.  The simulation and clinical setup was also reviewed as was the external and any internal shields and based on this review the appropriateness and sufficiency of treatment was determined.
Day Of The Week Treatment Administered: Monday
Treatment Time In Min (Optional): 0.42 min
Fractions / Week: 3
Fractionation Number: 16
Total Dose (Optional-Please Include Units): 5460 cGy
Dimensions-Y Axis In Cm: 1.5
Field Size (Applicator): 3.0 cm
Validate Note Data (See Information Below): Yes
Total Number Of Fractions Rx 2: 15
Shielding Size (Optional- Include Units): 2.5 cm
Information: Selecting Yes will display possible errors in your note based on the variables you have selected. This validation is only offered as a suggestion for you. PLEASE NOTE THAT THE VALIDATION TEXT WILL BE REMOVED WHEN YOU FINALIZE YOUR NOTE. IF YOU WANT TO FAX A PRELIMINARY NOTE YOU WILL NEED TO TOGGLE THIS TO 'NO' IF YOU DO NOT WANT IT IN YOUR FAXED NOTE.
Assessment: Appropriate reaction
Detail Level: Zone
Cumulative Dose In Cgy (Optional): 4369
Dose Per Fractionation In Cgy (Optional): 273
Daily Fractionated Dose (Optional- Include Units): 273 cGy
Pathology Override (Pathology Will Render As Diagnosis Name If Left Blank): Basal Supratip
Number Of Days Off Treatment: 1
Fractions / Week Rx 3: 5
Depth (Optional-Please Include Units): 2.17 mm
Custom Shielding Afterword Text Will Not Be Included With Simple Simulations (X X Y Cm............): port to correlate with the lesion size, including treatment margin. The custom lead shield is adequate to accommodate the appropriate applicator and provide adequate shielding around the treatment site. Additional shielding (as noted below) is used to protect sensitive, normal tissues.
Include Rx 3 When Rendering Additional Prescriptions: No
Total Number Of Fractions: 20
Patient Positioning: Sitting
Please Choose The Type Of Visit (Required): Weekly Evaluation Visit: Show Weekly Evaluation Variables
Additional Prescription Justification Text: If there is any interruption in treatment exceeding 5 days please see Decay and Dose Adjustment Calculation and complete treatment under Prescription 2, 3 or 4 as appropriate.
Dimensions-X Axis In Cm: 1.3
Body Location Override (Optional): Nasal Supratip
Energy (Optional-Please Include Units): 100 KV
Time Dose Fractionation (Optional- Include Units If Applicable): 97
Simple Simulation Preamble Text Will Be Included With Simple Simulations (.......... Indications): Simple simulation was performed today for the following reasons:
Custom Shielding Preamble Text Will Not Be Included With Simple Simulations (.......... X X Y Cm): A lead shield of 0.762 mm thickness is utilized to form a molded, custom shield with a
Functional Status: 0 (fully active)
Treatment Device Design After Initial Simulation Justification (Will Render If Bill For Treatment Devices = Yes): The patient is status post radiation simulation and is evaluated as to the use of additional devices for shielding and placement for radiation therapy.

## 2021-09-21 ENCOUNTER — APPOINTMENT (RX ONLY)
Dept: URBAN - METROPOLITAN AREA CLINIC 37 | Facility: CLINIC | Age: 86
Setting detail: DERMATOLOGY
End: 2021-09-21

## 2021-09-21 ENCOUNTER — APPOINTMENT (RX ONLY)
Dept: URBAN - METROPOLITAN AREA CLINIC 38 | Facility: CLINIC | Age: 86
Setting detail: DERMATOLOGY
End: 2021-09-21

## 2021-09-21 PROBLEM — C44.311 BASAL CELL CARCINOMA OF SKIN OF NOSE: Status: ACTIVE | Noted: 2021-09-21

## 2021-09-21 PROCEDURE — ? SUPERFICIAL RADIATION TREATMENT

## 2021-09-21 PROCEDURE — G6001 ECHO GUIDANCE RADIOTHERAPY: HCPCS

## 2021-09-21 PROCEDURE — 77280 THER RAD SIMULAJ FIELD SMPL: CPT

## 2021-09-21 PROCEDURE — ? TREATMENT REGIMEN

## 2021-09-21 PROCEDURE — 77401: CPT

## 2021-09-21 NOTE — PROCEDURE: SUPERFICIAL RADIATION TREATMENT
Dose / Tx In Cgy (Optional): 273
Field Size (Applicator): 3.0 cm
Energy (Optional-Please Include Units): 100 KV
Bill For Simulation And Treatment Device Design: Yes
Bill For Treatment Devices Only: No
Fractions / Week Rx 2: 5
Time Dose Fractionation (Optional- Include Units If Applicable): 97
Simple Simulation Preamble Text Will Be Included With Simple Simulations (.......... Indications): Simple simulation was performed today for the following reasons:
Prescription Used: 1
Custom Shielding Preamble Text Will Not Be Included With Simple Simulations (.......... X X Y Cm): A lead shield of 0.762 mm thickness is utilized to form a molded, custom shield with a
Body Location Override (Optional): Nasal Supratip
Total Number Of Fractions Rx 3: 15
Functional Status: 0 (fully active)
Treatment Time / Fractionation (Optional- Include Units): 0.42 min
Treatment Device Design After Initial Simulation Justification (Will Render If Bill For Treatment Devices = Yes): The patient is status post radiation simulation and is evaluated as to the use of additional devices for shielding and placement for radiation therapy.
Port Dimensions-Y Axis In Cm: 2.5
Initial Radiation Treatment Planning (Will Render If Bill Simulation = Yes): The patient had a complete consultation regarding all applicable modalities for the treatment of their skin cancer and based on a variety of factors including the type of tumor, size, and location, the relevant medical history as well as local tissue factors, the functional status of the individual, the ability to perform necessary postoperative wound instructions and the need for simultaneous treatments as well as overall wound healing status, it was determined that the patient would begin radiation therapy treatment for skin cancer.  A full simulation and treatment device design was performed including the determination and formulation of appropriate simple and complex devices including lead shield of 0.762 mm thickness to form molded customized shielding to specifically correlate with the lesion size including treatment margin.  The custom lead shield is adequate to accommodate the appropriate applicator and provide adequate shielding around the treatment site.  The specific field applicator, shields, and devices both simple and complex as well as the specific patient setup is outlined below.  The patient was given a full consent for superficial radiation to both verbally and in writing and the full determination of patient's eligibility for treatment and selection is outlined on the patient eligibility and treatment selection form.  The specific superficial radiotherapy prescription was determined and was documented on the superficial radiotherapy prescription form.  A treatment calculation was also performed and documented on the treatment calculation form.  Based on the prescription, the patient was scheduled for a series of fractional treatments.
Intro Statement (Will Not Render If Left Blank): The patient is undergoing superficial radiation therapy for skin cancer and presents for weekly evaluation and management.  Per protocol and as documented on the flow sheet, the patient was questioned as to subjective redness, pruritus, pain, drainage, fatigue, or any other symptoms.  Objectively, the radiation area was evaluated with regards to erythema, atrophy, scale, crusting, erosion, ulceration, edema, purpura, tenderness, warmth, drainage, and any other findings.  The plan was extensively reviewed including the dose, and dosing schedule.  The simulation and clinical setup was also reviewed as was the external and any internal shields and based on this review the appropriateness and sufficiency of treatment was determined.
Day Of The Week Treatment Administered: Tuesday
Fractions / Week: 3
Fractionation Number: 17
Total Dose (Optional-Please Include Units): 5460 cGy
Shielding Size (Optional- Include Units): 2.5 cm
Dimensions-Y Axis In Cm: 1.5
Information: Selecting Yes will display possible errors in your note based on the variables you have selected. This validation is only offered as a suggestion for you. PLEASE NOTE THAT THE VALIDATION TEXT WILL BE REMOVED WHEN YOU FINALIZE YOUR NOTE. IF YOU WANT TO FAX A PRELIMINARY NOTE YOU WILL NEED TO TOGGLE THIS TO 'NO' IF YOU DO NOT WANT IT IN YOUR FAXED NOTE.
Assessment: Appropriate reaction
Cumulative Dose In Cgy (Optional): 8854
Daily Fractionated Dose (Optional- Include Units): 273 cGy
Pathology Override (Pathology Will Render As Diagnosis Name If Left Blank): Basal Supratip
Depth (Optional-Please Include Units): 2.17 mm
Custom Shielding Afterword Text Will Not Be Included With Simple Simulations (X X Y Cm............): port to correlate with the lesion size, including treatment margin. The custom lead shield is adequate to accommodate the appropriate applicator and provide adequate shielding around the treatment site. Additional shielding (as noted below) is used to protect sensitive, normal tissues.
Patient Positioning: Sitting
Detail Level: Zone
Please Choose The Type Of Visit (Required): Weekly Evaluation Visit: Show Weekly Evaluation Variables
Total Number Of Fractions: 20
Additional Prescription Justification Text: If there is any interruption in treatment exceeding 5 days please see Decay and Dose Adjustment Calculation and complete treatment under Prescription 2, 3 or 4 as appropriate.
Dimensions-X Axis In Cm: 1.3

## 2021-09-21 NOTE — PROCEDURE: SUPERFICIAL RADIATION TREATMENT
Body Location Override (Optional): Nasal Supratip
Detail Level: Zone
Pathology Override (Pathology Will Render As Diagnosis Name If Left Blank): Basal Supratip
Field Number: 1
Dimensions-X Axis In Cm: 1.3
Dimensions-Y Axis In Cm: 1.5
Render Patient Eligibility And Selection In Note?: Yes
Render Additional Prescriptions In Note?: No
Please Choose The Type Of Visit (Required): Weekly Evaluation Visit: Show Weekly Evaluation Variables
Functional Status: 0 (fully active)
Port Dimensions-X Axis In Cm: 2.5
Field Size (Applicator): 3.0 cm
Patient Positioning: Sitting
Custom Shielding Preamble Text Will Not Be Included With Simple Simulations (.......... X X Y Cm): A lead shield of 0.762 mm thickness is utilized to form a molded, custom shield with a
Custom Shielding Afterword Text Will Not Be Included With Simple Simulations (X X Y Cm............): port to correlate with the lesion size, including treatment margin. The custom lead shield is adequate to accommodate the appropriate applicator and provide adequate shielding around the treatment site. Additional shielding (as noted below) is used to protect sensitive, normal tissues.
Initial Radiation Treatment Planning (Will Render If Bill Simulation = Yes): The patient had a complete consultation regarding all applicable modalities for the treatment of their skin cancer and based on a variety of factors including the type of tumor, size, and location, the relevant medical history as well as local tissue factors, the functional status of the individual, the ability to perform necessary postoperative wound instructions and the need for simultaneous treatments as well as overall wound healing status, it was determined that the patient would begin radiation therapy treatment for skin cancer.  A full simulation and treatment device design was performed including the determination and formulation of appropriate simple and complex devices including lead shield of 0.762 mm thickness to form molded customized shielding to specifically correlate with the lesion size including treatment margin.  The custom lead shield is adequate to accommodate the appropriate applicator and provide adequate shielding around the treatment site.  The specific field applicator, shields, and devices both simple and complex as well as the specific patient setup is outlined below.  The patient was given a full consent for superficial radiation to both verbally and in writing and the full determination of patient's eligibility for treatment and selection is outlined on the patient eligibility and treatment selection form.  The specific superficial radiotherapy prescription was determined and was documented on the superficial radiotherapy prescription form.  A treatment calculation was also performed and documented on the treatment calculation form.  Based on the prescription, the patient was scheduled for a series of fractional treatments.
Simple Simulation Preamble Text Will Be Included With Simple Simulations (.......... Indications): Simple simulation was performed today for the following reasons:
Treatment Device Design After Initial Simulation Justification (Will Render If Bill For Treatment Devices = Yes): The patient is status post radiation simulation and is evaluated as to the use of additional devices for shielding and placement for radiation therapy.
Additional Prescription Justification Text: If there is any interruption in treatment exceeding 5 days please see Decay and Dose Adjustment Calculation and complete treatment under Prescription 2, 3 or 4 as appropriate.
Shielding Size (Optional- Include Units): 2.5 cm
Energy (Optional-Please Include Units): 100 kv
Depth (Optional-Please Include Units): 2.17 mm
Fractions / Week: 3
Time Dose Fractionation (Optional- Include Units If Applicable): 97
Daily Fractionated Dose (Optional- Include Units): 273 cGy
Treatment Time / Fractionation (Optional- Include Units): 0.42 min
Total Number Of Fractions: 20
Total Dose (Optional-Please Include Units): 5460 cGy
Fractions / Week Rx 2: 5
Total Number Of Fractions Rx 2: 15
Dose Per Fractionation In Cgy (Optional): 273
Day Of The Week Treatment Administered: Tuesday
Fractionation Number: 17
Cumulative Dose In Cgy (Optional): 3321
Assessment: Appropriate reaction
Intro Statement (Will Not Render If Left Blank): The patient is undergoing superficial radiation therapy for skin cancer and presents for weekly evaluation and management.  Per protocol and as documented on the flow sheet, the patient was questioned as to subjective redness, pruritus, pain, drainage, fatigue, or any other symptoms.  Objectively, the radiation area was evaluated with regards to erythema, atrophy, scale, crusting, erosion, ulceration, edema, purpura, tenderness, warmth, drainage, and any other findings.  The plan was extensively reviewed including the dose, and dosing schedule.  The simulation and clinical setup was also reviewed as was the external and any internal shields and based on this review the appropriateness and sufficiency of treatment was determined.
Information: Selecting Yes will display possible errors in your note based on the variables you have selected. This validation is only offered as a suggestion for you. PLEASE NOTE THAT THE VALIDATION TEXT WILL BE REMOVED WHEN YOU FINALIZE YOUR NOTE. IF YOU WANT TO FAX A PRELIMINARY NOTE YOU WILL NEED TO TOGGLE THIS TO 'NO' IF YOU DO NOT WANT IT IN YOUR FAXED NOTE.

## 2021-09-23 ENCOUNTER — APPOINTMENT (RX ONLY)
Dept: URBAN - METROPOLITAN AREA CLINIC 37 | Facility: CLINIC | Age: 86
Setting detail: DERMATOLOGY
End: 2021-09-23

## 2021-09-23 ENCOUNTER — APPOINTMENT (RX ONLY)
Dept: URBAN - METROPOLITAN AREA CLINIC 38 | Facility: CLINIC | Age: 86
Setting detail: DERMATOLOGY
End: 2021-09-23

## 2021-09-23 PROBLEM — C44.311 BASAL CELL CARCINOMA OF SKIN OF NOSE: Status: ACTIVE | Noted: 2021-09-23

## 2021-09-23 PROCEDURE — ? SUPERFICIAL RADIATION TREATMENT

## 2021-09-23 PROCEDURE — 77401: CPT

## 2021-09-23 PROCEDURE — G6001 ECHO GUIDANCE RADIOTHERAPY: HCPCS

## 2021-09-23 PROCEDURE — ? TREATMENT REGIMEN

## 2021-09-23 PROCEDURE — 77280 THER RAD SIMULAJ FIELD SMPL: CPT

## 2021-09-23 NOTE — PROCEDURE: SUPERFICIAL RADIATION TREATMENT
Body Location Override (Optional): Nasal Supratip
Detail Level: Zone
Pathology Override (Pathology Will Render As Diagnosis Name If Left Blank): Basal Supratip
Field Number: 1
Dimensions-X Axis In Cm: 1.3
Dimensions-Y Axis In Cm: 1.5
Render Patient Eligibility And Selection In Note?: Yes
Render Additional Prescriptions In Note?: No
Please Choose The Type Of Visit (Required): Treatment Visit: Show Treatment Variables
Functional Status: 0 (fully active)
Port Dimensions-X Axis In Cm: 2.5
Field Size (Applicator): 3.0 cm
Patient Positioning: Sitting
Custom Shielding Preamble Text Will Not Be Included With Simple Simulations (.......... X X Y Cm): A lead shield of 0.762 mm thickness is utilized to form a molded, custom shield with a
Custom Shielding Afterword Text Will Not Be Included With Simple Simulations (X X Y Cm............): port to correlate with the lesion size, including treatment margin. The custom lead shield is adequate to accommodate the appropriate applicator and provide adequate shielding around the treatment site. Additional shielding (as noted below) is used to protect sensitive, normal tissues.
Initial Radiation Treatment Planning (Will Render If Bill Simulation = Yes): The patient had a complete consultation regarding all applicable modalities for the treatment of their skin cancer and based on a variety of factors including the type of tumor, size, and location, the relevant medical history as well as local tissue factors, the functional status of the individual, the ability to perform necessary postoperative wound instructions and the need for simultaneous treatments as well as overall wound healing status, it was determined that the patient would begin radiation therapy treatment for skin cancer.  A full simulation and treatment device design was performed including the determination and formulation of appropriate simple and complex devices including lead shield of 0.762 mm thickness to form molded customized shielding to specifically correlate with the lesion size including treatment margin.  The custom lead shield is adequate to accommodate the appropriate applicator and provide adequate shielding around the treatment site.  The specific field applicator, shields, and devices both simple and complex as well as the specific patient setup is outlined below.  The patient was given a full consent for superficial radiation to both verbally and in writing and the full determination of patient's eligibility for treatment and selection is outlined on the patient eligibility and treatment selection form.  The specific superficial radiotherapy prescription was determined and was documented on the superficial radiotherapy prescription form.  A treatment calculation was also performed and documented on the treatment calculation form.  Based on the prescription, the patient was scheduled for a series of fractional treatments.
Simple Simulation Preamble Text Will Be Included With Simple Simulations (.......... Indications): Simple simulation was performed today for the following reasons:
Treatment Device Design After Initial Simulation Justification (Will Render If Bill For Treatment Devices = Yes): The patient is status post radiation simulation and is evaluated as to the use of additional devices for shielding and placement for radiation therapy.
Additional Prescription Justification Text: If there is any interruption in treatment exceeding 5 days please see Decay and Dose Adjustment Calculation and complete treatment under Prescription 2, 3 or 4 as appropriate.
Shielding Size (Optional- Include Units): 2.5 cm
Energy (Optional-Please Include Units): 100 kv
Depth (Optional-Please Include Units): 2.17 mm
Fractions / Week: 3
Time Dose Fractionation (Optional- Include Units If Applicable): 97
Daily Fractionated Dose (Optional- Include Units): 273 cGy
Treatment Time / Fractionation (Optional- Include Units): 0.42 min
Total Number Of Fractions: 20
Total Dose (Optional-Please Include Units): 5460 cGy
Fractions / Week Rx 2: 5
Total Number Of Fractions Rx 2: 15
Dose Per Fractionation In Cgy (Optional): 273
Day Of The Week Treatment Administered: Thursday
Fractionation Number: 18
Cumulative Dose In Cgy (Optional): 4924
Assessment: Appropriate reaction
Intro Statement (Will Not Render If Left Blank): The patient is undergoing superficial radiation therapy for skin cancer and presents for weekly evaluation and management.  Per protocol and as documented on the flow sheet, the patient was questioned as to subjective redness, pruritus, pain, drainage, fatigue, or any other symptoms.  Objectively, the radiation area was evaluated with regards to erythema, atrophy, scale, crusting, erosion, ulceration, edema, purpura, tenderness, warmth, drainage, and any other findings.  The plan was extensively reviewed including the dose, and dosing schedule.  The simulation and clinical setup was also reviewed as was the external and any internal shields and based on this review the appropriateness and sufficiency of treatment was determined.
Information: Selecting Yes will display possible errors in your note based on the variables you have selected. This validation is only offered as a suggestion for you. PLEASE NOTE THAT THE VALIDATION TEXT WILL BE REMOVED WHEN YOU FINALIZE YOUR NOTE. IF YOU WANT TO FAX A PRELIMINARY NOTE YOU WILL NEED TO TOGGLE THIS TO 'NO' IF YOU DO NOT WANT IT IN YOUR FAXED NOTE.

## 2021-09-23 NOTE — PROCEDURE: SUPERFICIAL RADIATION TREATMENT
Total Dose (Optional-Please Include Units): 5460 cGy
Field Number: 1
Field Size (Applicator): 3.0 cm
Validate Note Data (See Information Below): Yes
Total Number Of Fractions Rx 2: 15
Shielding Size (Optional- Include Units): 2.5 cm
Information: Selecting Yes will display possible errors in your note based on the variables you have selected. This validation is only offered as a suggestion for you. PLEASE NOTE THAT THE VALIDATION TEXT WILL BE REMOVED WHEN YOU FINALIZE YOUR NOTE. IF YOU WANT TO FAX A PRELIMINARY NOTE YOU WILL NEED TO TOGGLE THIS TO 'NO' IF YOU DO NOT WANT IT IN YOUR FAXED NOTE.
Assessment: Appropriate reaction
Simple Simulation Afterword Text Will Be Included With Simple Simulations (Indications............): The patient had a complete consultation regarding all applicable modalities for the treatment of their skin cancer and based on a variety of factors including the type of tumor, size, and location, the relevant medical history as well as local tissue factors, the functional status of the individual, the ability to perform necessary postoperative wound instructions and the need for simultaneous treatments as well as overall wound healing status, it was determined that the patient would begin radiation therapy treatment for skin cancer.  A full simulation and treatment device design was performed including the determination and formulation of appropriate simple and complex devices including lead shield of 0.762 mm thickness to form molded customized shielding to specifically correlate with the lesion size including treatment margin.  The custom lead shield is adequate to accommodate the appropriate applicator and provide adequate shielding around the treatment site.  The specific field applicator, shields, and devices both simple and complex as well as the specific patient setup is outlined below.  The patient was given a full consent for superficial radiation to both verbally and in writing and the full determination of patient's eligibility for treatment and selection is outlined on the patient eligibility and treatment selection form.  The specific superficial radiotherapy prescription was determined and was documented on the superficial radiotherapy prescription form.  A treatment calculation was also performed and documented on the treatment calculation form.  Based on the prescription, the patient was scheduled for a series of fractional treatments.
Day Of The Week Treatment Administered: Thursday
Cumulative Dose In Cgy (Optional): 4940
Fractionation Number: 18
Dimensions-Y Axis In Cm: 1.5
Daily Fractionated Dose (Optional- Include Units): 273 cGy
Fractions / Week Rx 3: 5
Port Dimensions-X Axis In Cm: 2.5
Depth (Optional-Please Include Units): 2.17 mm
Patient Positioning: Sitting
Dose Per Fractionation In Cgy (Optional): 273
Custom Shielding Afterword Text Will Not Be Included With Simple Simulations (X X Y Cm............): port to correlate with the lesion size, including treatment margin. The custom lead shield is adequate to accommodate the appropriate applicator and provide adequate shielding around the treatment site. Additional shielding (as noted below) is used to protect sensitive, normal tissues.
Detail Level: Zone
Include Rx 3 When Rendering Additional Prescriptions: No
Pathology Override (Pathology Will Render As Diagnosis Name If Left Blank): Basal Supratip
Additional Prescription Justification Text: If there is any interruption in treatment exceeding 5 days please see Decay and Dose Adjustment Calculation and complete treatment under Prescription 2, 3 or 4 as appropriate.
Total Number Of Fractions: 20
Simple Simulation Preamble Text Will Be Included With Simple Simulations (.......... Indications): Simple simulation was performed today for the following reasons:
Dimensions-X Axis In Cm: 1.3
Please Choose The Type Of Visit (Required): Treatment Visit: Show Treatment Variables
Time Dose Fractionation (Optional- Include Units If Applicable): 97
Body Location Override (Optional): Nasal Supratip
Energy (Optional-Please Include Units): 100 kv
Custom Shielding Preamble Text Will Not Be Included With Simple Simulations (.......... X X Y Cm): A lead shield of 0.762 mm thickness is utilized to form a molded, custom shield with a
Treatment Device Design After Initial Simulation Justification (Will Render If Bill For Treatment Devices = Yes): The patient is status post radiation simulation and is evaluated as to the use of additional devices for shielding and placement for radiation therapy.
Functional Status: 0 (fully active)
Treatment Time / Fractionation (Optional- Include Units): 0.42 min
Fractions / Week: 3
Intro Statement (Will Not Render If Left Blank): The patient is undergoing superficial radiation therapy for skin cancer and presents for weekly evaluation and management.  Per protocol and as documented on the flow sheet, the patient was questioned as to subjective redness, pruritus, pain, drainage, fatigue, or any other symptoms.  Objectively, the radiation area was evaluated with regards to erythema, atrophy, scale, crusting, erosion, ulceration, edema, purpura, tenderness, warmth, drainage, and any other findings.  The plan was extensively reviewed including the dose, and dosing schedule.  The simulation and clinical setup was also reviewed as was the external and any internal shields and based on this review the appropriateness and sufficiency of treatment was determined.

## 2021-09-27 ENCOUNTER — APPOINTMENT (RX ONLY)
Dept: URBAN - METROPOLITAN AREA CLINIC 38 | Facility: CLINIC | Age: 86
Setting detail: DERMATOLOGY
End: 2021-09-27

## 2021-09-27 ENCOUNTER — APPOINTMENT (RX ONLY)
Dept: URBAN - METROPOLITAN AREA CLINIC 37 | Facility: CLINIC | Age: 86
Setting detail: DERMATOLOGY
End: 2021-09-27

## 2021-09-27 PROBLEM — C44.311 BASAL CELL CARCINOMA OF SKIN OF NOSE: Status: ACTIVE | Noted: 2021-09-27

## 2021-09-27 PROCEDURE — 77401: CPT

## 2021-09-27 PROCEDURE — G6001 ECHO GUIDANCE RADIOTHERAPY: HCPCS

## 2021-09-27 PROCEDURE — ? TREATMENT REGIMEN

## 2021-09-27 PROCEDURE — 77280 THER RAD SIMULAJ FIELD SMPL: CPT

## 2021-09-27 PROCEDURE — ? SUPERFICIAL RADIATION TREATMENT

## 2021-09-27 NOTE — PROCEDURE: SUPERFICIAL RADIATION TREATMENT
Fractions / Week Rx 4: 5
Initial Radiation Treatment Planning (Will Render If Bill Simulation = Yes): The patient had a complete consultation regarding all applicable modalities for the treatment of their skin cancer and based on a variety of factors including the type of tumor, size, and location, the relevant medical history as well as local tissue factors, the functional status of the individual, the ability to perform necessary postoperative wound instructions and the need for simultaneous treatments as well as overall wound healing status, it was determined that the patient would begin radiation therapy treatment for skin cancer.  A full simulation and treatment device design was performed including the determination and formulation of appropriate simple and complex devices including lead shield of 0.762 mm thickness to form molded customized shielding to specifically correlate with the lesion size including treatment margin.  The custom lead shield is adequate to accommodate the appropriate applicator and provide adequate shielding around the treatment site.  The specific field applicator, shields, and devices both simple and complex as well as the specific patient setup is outlined below.  The patient was given a full consent for superficial radiation to both verbally and in writing and the full determination of patient's eligibility for treatment and selection is outlined on the patient eligibility and treatment selection form.  The specific superficial radiotherapy prescription was determined and was documented on the superficial radiotherapy prescription form.  A treatment calculation was also performed and documented on the treatment calculation form.  Based on the prescription, the patient was scheduled for a series of fractional treatments.
Fractions / Week: 3
Field Number: 1
Field Size (Applicator): 3.0 cm
Validate Note Data (See Information Below): Yes
Intro Statement (Will Not Render If Left Blank): The patient is undergoing superficial radiation therapy for skin cancer and presents for weekly evaluation and management.  Per protocol and as documented on the flow sheet, the patient was questioned as to subjective redness, pruritus, pain, drainage, fatigue, or any other symptoms.  Objectively, the radiation area was evaluated with regards to erythema, atrophy, scale, crusting, erosion, ulceration, edema, purpura, tenderness, warmth, drainage, and any other findings.  The plan was extensively reviewed including the dose, and dosing schedule.  The simulation and clinical setup was also reviewed as was the external and any internal shields and based on this review the appropriateness and sufficiency of treatment was determined.
Include Rx 4 When Rendering Additional Prescriptions: No
Shielding Size (Optional- Include Units): 2.5 cm
Information: Selecting Yes will display possible errors in your note based on the variables you have selected. This validation is only offered as a suggestion for you. PLEASE NOTE THAT THE VALIDATION TEXT WILL BE REMOVED WHEN YOU FINALIZE YOUR NOTE. IF YOU WANT TO FAX A PRELIMINARY NOTE YOU WILL NEED TO TOGGLE THIS TO 'NO' IF YOU DO NOT WANT IT IN YOUR FAXED NOTE.
Assessment: Appropriate reaction
Total Dose (Optional-Please Include Units): 5460 cGy
Total Number Of Fractions Rx 2: 15
Treatment Time In Min (Optional): 0.42 min
Daily Fractionated Dose (Optional- Include Units): 273 cGy
Day Of The Week Treatment Administered: Monday
Port Dimensions-X Axis In Cm: 2.5
Fractionation Number: 19
Dimensions-Y Axis In Cm: 1.5
Dose Per Fractionation In Cgy (Optional): 273
Detail Level: Zone
Depth (Optional-Please Include Units): 2.17 mm
Custom Shielding Afterword Text Will Not Be Included With Simple Simulations (X X Y Cm............): port to correlate with the lesion size, including treatment margin. The custom lead shield is adequate to accommodate the appropriate applicator and provide adequate shielding around the treatment site. Additional shielding (as noted below) is used to protect sensitive, normal tissues.
Pathology Override (Pathology Will Render As Diagnosis Name If Left Blank): Basal Supratip
Additional Prescription Justification Text: If there is any interruption in treatment exceeding 5 days please see Decay and Dose Adjustment Calculation and complete treatment under Prescription 2, 3 or 4 as appropriate.
Cumulative Dose In Cgy (Optional): 3264
Patient Positioning: Sitting
Total Number Of Fractions: 20
Time Dose Fractionation (Optional- Include Units If Applicable): 97
Simple Simulation Preamble Text Will Be Included With Simple Simulations (.......... Indications): Simple simulation was performed today for the following reasons:
Please Choose The Type Of Visit (Required): Treatment Visit: Show Treatment Variables
Dimensions-X Axis In Cm: 1.3
Custom Shielding Preamble Text Will Not Be Included With Simple Simulations (.......... X X Y Cm): A lead shield of 0.762 mm thickness is utilized to form a molded, custom shield with a
Energy (Optional-Please Include Units): 100 KV
Body Location Override (Optional): Nasal Supratip
Treatment Device Design After Initial Simulation Justification (Will Render If Bill For Treatment Devices = Yes): The patient is status post radiation simulation and is evaluated as to the use of additional devices for shielding and placement for radiation therapy.
Functional Status: 0 (fully active)

## 2021-09-27 NOTE — PROCEDURE: SUPERFICIAL RADIATION TREATMENT
Body Location Override (Optional): Nasal Supratip
Detail Level: Zone
Pathology Override (Pathology Will Render As Diagnosis Name If Left Blank): Basal Supratip
Field Number: 1
Dimensions-X Axis In Cm: 1.3
Dimensions-Y Axis In Cm: 1.5
Render Patient Eligibility And Selection In Note?: Yes
Render Additional Prescriptions In Note?: No
Please Choose The Type Of Visit (Required): Treatment Visit: Show Treatment Variables
Functional Status: 0 (fully active)
Port Dimensions-X Axis In Cm: 2.5
Field Size (Applicator): 3.0 cm
Patient Positioning: Sitting
Custom Shielding Preamble Text Will Not Be Included With Simple Simulations (.......... X X Y Cm): A lead shield of 0.762 mm thickness is utilized to form a molded, custom shield with a
Custom Shielding Afterword Text Will Not Be Included With Simple Simulations (X X Y Cm............): port to correlate with the lesion size, including treatment margin. The custom lead shield is adequate to accommodate the appropriate applicator and provide adequate shielding around the treatment site. Additional shielding (as noted below) is used to protect sensitive, normal tissues.
Initial Radiation Treatment Planning (Will Render If Bill Simulation = Yes): The patient had a complete consultation regarding all applicable modalities for the treatment of their skin cancer and based on a variety of factors including the type of tumor, size, and location, the relevant medical history as well as local tissue factors, the functional status of the individual, the ability to perform necessary postoperative wound instructions and the need for simultaneous treatments as well as overall wound healing status, it was determined that the patient would begin radiation therapy treatment for skin cancer.  A full simulation and treatment device design was performed including the determination and formulation of appropriate simple and complex devices including lead shield of 0.762 mm thickness to form molded customized shielding to specifically correlate with the lesion size including treatment margin.  The custom lead shield is adequate to accommodate the appropriate applicator and provide adequate shielding around the treatment site.  The specific field applicator, shields, and devices both simple and complex as well as the specific patient setup is outlined below.  The patient was given a full consent for superficial radiation to both verbally and in writing and the full determination of patient's eligibility for treatment and selection is outlined on the patient eligibility and treatment selection form.  The specific superficial radiotherapy prescription was determined and was documented on the superficial radiotherapy prescription form.  A treatment calculation was also performed and documented on the treatment calculation form.  Based on the prescription, the patient was scheduled for a series of fractional treatments.
Simple Simulation Preamble Text Will Be Included With Simple Simulations (.......... Indications): Simple simulation was performed today for the following reasons:
Treatment Device Design After Initial Simulation Justification (Will Render If Bill For Treatment Devices = Yes): The patient is status post radiation simulation and is evaluated as to the use of additional devices for shielding and placement for radiation therapy.
Additional Prescription Justification Text: If there is any interruption in treatment exceeding 5 days please see Decay and Dose Adjustment Calculation and complete treatment under Prescription 2, 3 or 4 as appropriate.
Shielding Size (Optional- Include Units): 2.5 cm
Energy (Optional-Please Include Units): 100 kv
Depth (Optional-Please Include Units): 2.17 mm
Fractions / Week: 3
Time Dose Fractionation (Optional- Include Units If Applicable): 97
Daily Fractionated Dose (Optional- Include Units): 273 cGy
Treatment Time / Fractionation (Optional- Include Units): 0.42 min
Total Number Of Fractions: 20
Total Dose (Optional-Please Include Units): 5460 cGy
Fractions / Week Rx 2: 5
Total Number Of Fractions Rx 2: 15
Dose Per Fractionation In Cgy (Optional): 273
Day Of The Week Treatment Administered: Monday
Fractionation Number: 19
Cumulative Dose In Cgy (Optional): 5666
Assessment: Appropriate reaction
Intro Statement (Will Not Render If Left Blank): The patient is undergoing superficial radiation therapy for skin cancer and presents for weekly evaluation and management.  Per protocol and as documented on the flow sheet, the patient was questioned as to subjective redness, pruritus, pain, drainage, fatigue, or any other symptoms.  Objectively, the radiation area was evaluated with regards to erythema, atrophy, scale, crusting, erosion, ulceration, edema, purpura, tenderness, warmth, drainage, and any other findings.  The plan was extensively reviewed including the dose, and dosing schedule.  The simulation and clinical setup was also reviewed as was the external and any internal shields and based on this review the appropriateness and sufficiency of treatment was determined.
Information: Selecting Yes will display possible errors in your note based on the variables you have selected. This validation is only offered as a suggestion for you. PLEASE NOTE THAT THE VALIDATION TEXT WILL BE REMOVED WHEN YOU FINALIZE YOUR NOTE. IF YOU WANT TO FAX A PRELIMINARY NOTE YOU WILL NEED TO TOGGLE THIS TO 'NO' IF YOU DO NOT WANT IT IN YOUR FAXED NOTE.

## 2021-09-28 ENCOUNTER — APPOINTMENT (RX ONLY)
Dept: URBAN - METROPOLITAN AREA CLINIC 37 | Facility: CLINIC | Age: 86
Setting detail: DERMATOLOGY
End: 2021-09-28

## 2021-09-28 ENCOUNTER — APPOINTMENT (RX ONLY)
Dept: URBAN - METROPOLITAN AREA CLINIC 38 | Facility: CLINIC | Age: 86
Setting detail: DERMATOLOGY
End: 2021-09-28

## 2021-09-28 PROBLEM — C44.311 BASAL CELL CARCINOMA OF SKIN OF NOSE: Status: ACTIVE | Noted: 2021-09-28

## 2021-09-28 PROCEDURE — 77401: CPT

## 2021-09-28 PROCEDURE — ? TREATMENT REGIMEN

## 2021-09-28 PROCEDURE — 77280 THER RAD SIMULAJ FIELD SMPL: CPT

## 2021-09-28 PROCEDURE — ? SUPERFICIAL RADIATION TREATMENT

## 2021-09-28 PROCEDURE — G6001 ECHO GUIDANCE RADIOTHERAPY: HCPCS

## 2021-09-28 PROCEDURE — 77427 RADIATION TX MANAGEMENT X5: CPT

## 2021-09-28 NOTE — PROCEDURE: SUPERFICIAL RADIATION TREATMENT
Render Prescriptions In Note?: Yes
Time Dose Fractionation (Optional- Include Units If Applicable): 97
Field Size (Applicator): 3.0 cm
Fractions / Week Rx 2: 5
Simple Simulation Preamble Text Will Be Included With Simple Simulations (.......... Indications): Simple simulation was performed today for the following reasons:
Render Additional Prescriptions In Note?: No
Please Choose The Type Of Visit (Required): Weekly Evaluation Visit: Show Weekly Evaluation Variables
Dimensions-X Axis In Cm: 1.3
Body Location Override (Optional): Nasal Supratip
Energy (Optional-Please Include Units): 100 kv
Custom Shielding Preamble Text Will Not Be Included With Simple Simulations (.......... X X Y Cm): A lead shield of 0.762 mm thickness is utilized to form a molded, custom shield with a
Number Of Treatment Days: 1
Treatment Time / Fractionation (Optional- Include Units): 0.42 min
Treatment Device Design After Initial Simulation Justification (Will Render If Bill For Treatment Devices = Yes): The patient is status post radiation simulation and is evaluated as to the use of additional devices for shielding and placement for radiation therapy.
Dose / Tx In Cgy (Optional): 273
Total Number Of Fractions Rx 3: 15
Functional Status: 0 (fully active)
Port Dimensions-Y Axis In Cm: 2.5
Initial Radiation Treatment Planning (Will Render If Bill Simulation = Yes): The patient had a complete consultation regarding all applicable modalities for the treatment of their skin cancer and based on a variety of factors including the type of tumor, size, and location, the relevant medical history as well as local tissue factors, the functional status of the individual, the ability to perform necessary postoperative wound instructions and the need for simultaneous treatments as well as overall wound healing status, it was determined that the patient would begin radiation therapy treatment for skin cancer.  A full simulation and treatment device design was performed including the determination and formulation of appropriate simple and complex devices including lead shield of 0.762 mm thickness to form molded customized shielding to specifically correlate with the lesion size including treatment margin.  The custom lead shield is adequate to accommodate the appropriate applicator and provide adequate shielding around the treatment site.  The specific field applicator, shields, and devices both simple and complex as well as the specific patient setup is outlined below.  The patient was given a full consent for superficial radiation to both verbally and in writing and the full determination of patient's eligibility for treatment and selection is outlined on the patient eligibility and treatment selection form.  The specific superficial radiotherapy prescription was determined and was documented on the superficial radiotherapy prescription form.  A treatment calculation was also performed and documented on the treatment calculation form.  Based on the prescription, the patient was scheduled for a series of fractional treatments.
Fractions / Week: 3
Total Dose (Optional-Please Include Units): 5460 cGy
Shielding Size (Optional- Include Units): 2.5 cm
Information: Selecting Yes will display possible errors in your note based on the variables you have selected. This validation is only offered as a suggestion for you. PLEASE NOTE THAT THE VALIDATION TEXT WILL BE REMOVED WHEN YOU FINALIZE YOUR NOTE. IF YOU WANT TO FAX A PRELIMINARY NOTE YOU WILL NEED TO TOGGLE THIS TO 'NO' IF YOU DO NOT WANT IT IN YOUR FAXED NOTE.
Intro Statement (Will Not Render If Left Blank): The patient is undergoing superficial radiation therapy for skin cancer and presents for weekly evaluation and management.  Per protocol and as documented on the flow sheet, the patient was questioned as to subjective redness, pruritus, pain, drainage, fatigue, or any other symptoms.  Objectively, the radiation area was evaluated with regards to erythema, atrophy, scale, crusting, erosion, ulceration, edema, purpura, tenderness, warmth, drainage, and any other findings.  The plan was extensively reviewed including the dose, and dosing schedule.  The simulation and clinical setup was also reviewed as was the external and any internal shields and based on this review the appropriateness and sufficiency of treatment was determined.
Daily Fractionated Dose (Optional- Include Units): 273 cGy
Fractionation Number: 20
Day Of The Week Treatment Administered: Tuesday
Dimensions-Y Axis In Cm: 1.5
Assessment: Appropriate reaction
Detail Level: Zone
Cumulative Dose In Cgy (Optional): 4337
Depth (Optional-Please Include Units): 2.17 mm
Custom Shielding Afterword Text Will Not Be Included With Simple Simulations (X X Y Cm............): port to correlate with the lesion size, including treatment margin. The custom lead shield is adequate to accommodate the appropriate applicator and provide adequate shielding around the treatment site. Additional shielding (as noted below) is used to protect sensitive, normal tissues.
Additional Prescription Justification Text: If there is any interruption in treatment exceeding 5 days please see Decay and Dose Adjustment Calculation and complete treatment under Prescription 2, 3 or 4 as appropriate.
Patient Positioning: Sitting
Pathology Override (Pathology Will Render As Diagnosis Name If Left Blank): Basal Supratip

## 2021-09-28 NOTE — PROCEDURE: SUPERFICIAL RADIATION TREATMENT
Body Location Override (Optional): Nasal Supratip
Detail Level: Zone
Pathology Override (Pathology Will Render As Diagnosis Name If Left Blank): Basal Supratip
Field Number: 1
Dimensions-X Axis In Cm: 1.3
Dimensions-Y Axis In Cm: 1.5
Render Patient Eligibility And Selection In Note?: Yes
Render Additional Prescriptions In Note?: No
Please Choose The Type Of Visit (Required): Weekly Evaluation Visit: Show Weekly Evaluation Variables
Functional Status: 0 (fully active)
Port Dimensions-X Axis In Cm: 2.5
Field Size (Applicator): 3.0 cm
Patient Positioning: Sitting
Custom Shielding Preamble Text Will Not Be Included With Simple Simulations (.......... X X Y Cm): A lead shield of 0.762 mm thickness is utilized to form a molded, custom shield with a
Custom Shielding Afterword Text Will Not Be Included With Simple Simulations (X X Y Cm............): port to correlate with the lesion size, including treatment margin. The custom lead shield is adequate to accommodate the appropriate applicator and provide adequate shielding around the treatment site. Additional shielding (as noted below) is used to protect sensitive, normal tissues.
Initial Radiation Treatment Planning (Will Render If Bill Simulation = Yes): The patient had a complete consultation regarding all applicable modalities for the treatment of their skin cancer and based on a variety of factors including the type of tumor, size, and location, the relevant medical history as well as local tissue factors, the functional status of the individual, the ability to perform necessary postoperative wound instructions and the need for simultaneous treatments as well as overall wound healing status, it was determined that the patient would begin radiation therapy treatment for skin cancer.  A full simulation and treatment device design was performed including the determination and formulation of appropriate simple and complex devices including lead shield of 0.762 mm thickness to form molded customized shielding to specifically correlate with the lesion size including treatment margin.  The custom lead shield is adequate to accommodate the appropriate applicator and provide adequate shielding around the treatment site.  The specific field applicator, shields, and devices both simple and complex as well as the specific patient setup is outlined below.  The patient was given a full consent for superficial radiation to both verbally and in writing and the full determination of patient's eligibility for treatment and selection is outlined on the patient eligibility and treatment selection form.  The specific superficial radiotherapy prescription was determined and was documented on the superficial radiotherapy prescription form.  A treatment calculation was also performed and documented on the treatment calculation form.  Based on the prescription, the patient was scheduled for a series of fractional treatments.
Simple Simulation Preamble Text Will Be Included With Simple Simulations (.......... Indications): Simple simulation was performed today for the following reasons:
Treatment Device Design After Initial Simulation Justification (Will Render If Bill For Treatment Devices = Yes): The patient is status post radiation simulation and is evaluated as to the use of additional devices for shielding and placement for radiation therapy.
Additional Prescription Justification Text: If there is any interruption in treatment exceeding 5 days please see Decay and Dose Adjustment Calculation and complete treatment under Prescription 2, 3 or 4 as appropriate.
Shielding Size (Optional- Include Units): 2.5 cm
Energy (Optional-Please Include Units): 100 kv
Depth (Optional-Please Include Units): 2.17 mm
Fractions / Week: 3
Time Dose Fractionation (Optional- Include Units If Applicable): 97
Daily Fractionated Dose (Optional- Include Units): 273 cGy
Treatment Time / Fractionation (Optional- Include Units): 0.42 min
Total Number Of Fractions: 20
Total Dose (Optional-Please Include Units): 5460 cGy
Fractions / Week Rx 2: 5
Total Number Of Fractions Rx 2: 15
Dose Per Fractionation In Cgy (Optional): 273
Day Of The Week Treatment Administered: Tuesday
Cumulative Dose In Cgy (Optional): 3102
Assessment: Appropriate reaction
Intro Statement (Will Not Render If Left Blank): The patient is undergoing superficial radiation therapy for skin cancer and presents for weekly evaluation and management.  Per protocol and as documented on the flow sheet, the patient was questioned as to subjective redness, pruritus, pain, drainage, fatigue, or any other symptoms.  Objectively, the radiation area was evaluated with regards to erythema, atrophy, scale, crusting, erosion, ulceration, edema, purpura, tenderness, warmth, drainage, and any other findings.  The plan was extensively reviewed including the dose, and dosing schedule.  The simulation and clinical setup was also reviewed as was the external and any internal shields and based on this review the appropriateness and sufficiency of treatment was determined.
Information: Selecting Yes will display possible errors in your note based on the variables you have selected. This validation is only offered as a suggestion for you. PLEASE NOTE THAT THE VALIDATION TEXT WILL BE REMOVED WHEN YOU FINALIZE YOUR NOTE. IF YOU WANT TO FAX A PRELIMINARY NOTE YOU WILL NEED TO TOGGLE THIS TO 'NO' IF YOU DO NOT WANT IT IN YOUR FAXED NOTE.

## 2021-12-02 ENCOUNTER — APPOINTMENT (RX ONLY)
Dept: URBAN - METROPOLITAN AREA CLINIC 41 | Facility: CLINIC | Age: 86
Setting detail: DERMATOLOGY
End: 2021-12-02

## 2021-12-02 DIAGNOSIS — Z85.828 PERSONAL HISTORY OF OTHER MALIGNANT NEOPLASM OF SKIN: ICD-10-CM

## 2021-12-02 PROCEDURE — 99212 OFFICE O/P EST SF 10 MIN: CPT

## 2021-12-02 PROCEDURE — ? COUNSELING

## 2021-12-02 ASSESSMENT — LOCATION ZONE DERM: LOCATION ZONE: NOSE

## 2021-12-02 ASSESSMENT — LOCATION SIMPLE DESCRIPTION DERM: LOCATION SIMPLE: NOSE

## 2021-12-02 ASSESSMENT — LOCATION DETAILED DESCRIPTION DERM: LOCATION DETAILED: NASAL DORSUM

## 2021-12-02 NOTE — PROCEDURE: COUNSELING
Detail Level: Detailed
Patient Specific Counseling (Will Not Stick From Patient To Patient): There is visible protruding cartilage arising from the the radiated location. There are no signs of recurrence. Patient will follow up with their regular dermatologist for his six month skin exam.

## 2022-03-23 NOTE — PROCEDURE: SUPERFICIAL RADIATION TREATMENT
Body Location Override (Optional): Nasal Supratip
Detail Level: Zone
Pathology Override (Pathology Will Render As Diagnosis Name If Left Blank): Basal Supratip
Field Number: 1
Dimensions-X Axis In Cm: 1.3
Dimensions-Y Axis In Cm: 1.5
Render Patient Eligibility And Selection In Note?: Yes
Render Additional Prescriptions In Note?: No
Please Choose The Type Of Visit (Required): Treatment Visit: Show Treatment Variables
Functional Status: 0 (fully active)
Port Dimensions-X Axis In Cm: 2.5
Field Size (Applicator): 3.0 cm
Patient Positioning: Sitting
Custom Shielding Preamble Text Will Not Be Included With Simple Simulations (.......... X X Y Cm): A lead shield of 0.762 mm thickness is utilized to form a molded, custom shield with a
Custom Shielding Afterword Text Will Not Be Included With Simple Simulations (X X Y Cm............): port to correlate with the lesion size, including treatment margin. The custom lead shield is adequate to accommodate the appropriate applicator and provide adequate shielding around the treatment site. Additional shielding (as noted below) is used to protect sensitive, normal tissues.
Initial Radiation Treatment Planning (Will Render If Bill Simulation = Yes): The patient had a complete consultation regarding all applicable modalities for the treatment of their skin cancer and based on a variety of factors including the type of tumor, size, and location, the relevant medical history as well as local tissue factors, the functional status of the individual, the ability to perform necessary postoperative wound instructions and the need for simultaneous treatments as well as overall wound healing status, it was determined that the patient would begin radiation therapy treatment for skin cancer.  A full simulation and treatment device design was performed including the determination and formulation of appropriate simple and complex devices including lead shield of 0.762 mm thickness to form molded customized shielding to specifically correlate with the lesion size including treatment margin.  The custom lead shield is adequate to accommodate the appropriate applicator and provide adequate shielding around the treatment site.  The specific field applicator, shields, and devices both simple and complex as well as the specific patient setup is outlined below.  The patient was given a full consent for superficial radiation to both verbally and in writing and the full determination of patient's eligibility for treatment and selection is outlined on the patient eligibility and treatment selection form.  The specific superficial radiotherapy prescription was determined and was documented on the superficial radiotherapy prescription form.  A treatment calculation was also performed and documented on the treatment calculation form.  Based on the prescription, the patient was scheduled for a series of fractional treatments.
Simple Simulation Preamble Text Will Be Included With Simple Simulations (.......... Indications): Simple simulation was performed today for the following reasons:
Treatment Device Design After Initial Simulation Justification (Will Render If Bill For Treatment Devices = Yes): The patient is status post radiation simulation and is evaluated as to the use of additional devices for shielding and placement for radiation therapy.
Additional Prescription Justification Text: If there is any interruption in treatment exceeding 5 days please see Decay and Dose Adjustment Calculation and complete treatment under Prescription 2, 3 or 4 as appropriate.
Shielding Size (Optional- Include Units): 2.5 cm
Energy (Optional-Please Include Units): 100 kv
Depth (Optional-Please Include Units): 2.17 mm
Fractions / Week: 3
Time Dose Fractionation (Optional- Include Units If Applicable): 97
Daily Fractionated Dose (Optional- Include Units): 273 cGy
Treatment Time / Fractionation (Optional- Include Units): 0.42 min
Total Number Of Fractions: 20
Total Dose (Optional-Please Include Units): 5460 cGy
Fractions / Week Rx 2: 5
Total Number Of Fractions Rx 2: 15
Dose Per Fractionation In Cgy (Optional): 273
Day Of The Week Treatment Administered: Monday
Fractionation Number: 7
Cumulative Dose In Cgy (Optional): 1911
Assessment: Appropriate reaction
Intro Statement (Will Not Render If Left Blank): The patient is undergoing superficial radiation therapy for skin cancer and presents for weekly evaluation and management.  Per protocol and as documented on the flow sheet, the patient was questioned as to subjective redness, pruritus, pain, drainage, fatigue, or any other symptoms.  Objectively, the radiation area was evaluated with regards to erythema, atrophy, scale, crusting, erosion, ulceration, edema, purpura, tenderness, warmth, drainage, and any other findings.  The plan was extensively reviewed including the dose, and dosing schedule.  The simulation and clinical setup was also reviewed as was the external and any internal shields and based on this review the appropriateness and sufficiency of treatment was determined.
Information: Selecting Yes will display possible errors in your note based on the variables you have selected. This validation is only offered as a suggestion for you. PLEASE NOTE THAT THE VALIDATION TEXT WILL BE REMOVED WHEN YOU FINALIZE YOUR NOTE. IF YOU WANT TO FAX A PRELIMINARY NOTE YOU WILL NEED TO TOGGLE THIS TO 'NO' IF YOU DO NOT WANT IT IN YOUR FAXED NOTE.
23-Mar-2022 14:06

## 2022-06-02 ENCOUNTER — APPOINTMENT (RX ONLY)
Dept: URBAN - METROPOLITAN AREA CLINIC 33 | Facility: CLINIC | Age: 87
Setting detail: DERMATOLOGY
End: 2022-06-02

## 2022-06-02 DIAGNOSIS — T07XXXA INSECT BITE, NONVENOMOUS, OF OTHER, MULTIPLE, AND UNSPECIFIED SITES, WITHOUT MENTION OF INFECTION: ICD-10-CM

## 2022-06-02 DIAGNOSIS — L73.8 OTHER SPECIFIED FOLLICULAR DISORDERS: ICD-10-CM

## 2022-06-02 DIAGNOSIS — Z85.828 PERSONAL HISTORY OF OTHER MALIGNANT NEOPLASM OF SKIN: ICD-10-CM

## 2022-06-02 DIAGNOSIS — D22 MELANOCYTIC NEVI: ICD-10-CM

## 2022-06-02 DIAGNOSIS — D18.0 HEMANGIOMA: ICD-10-CM

## 2022-06-02 DIAGNOSIS — L82.1 OTHER SEBORRHEIC KERATOSIS: ICD-10-CM

## 2022-06-02 PROBLEM — S60.561A INSECT BITE (NONVENOMOUS) OF RIGHT HAND, INITIAL ENCOUNTER: Status: ACTIVE | Noted: 2022-06-02

## 2022-06-02 PROBLEM — D22.5 MELANOCYTIC NEVI OF TRUNK: Status: ACTIVE | Noted: 2022-06-02

## 2022-06-02 PROBLEM — D22.72 MELANOCYTIC NEVI OF LEFT LOWER LIMB, INCLUDING HIP: Status: ACTIVE | Noted: 2022-06-02

## 2022-06-02 PROBLEM — D18.01 HEMANGIOMA OF SKIN AND SUBCUTANEOUS TISSUE: Status: ACTIVE | Noted: 2022-06-02

## 2022-06-02 PROBLEM — D22.39 MELANOCYTIC NEVI OF OTHER PARTS OF FACE: Status: ACTIVE | Noted: 2022-06-02

## 2022-06-02 PROCEDURE — ? SUNSCREEN RECOMMENDATIONS

## 2022-06-02 PROCEDURE — 99213 OFFICE O/P EST LOW 20 MIN: CPT

## 2022-06-02 PROCEDURE — ? COUNSELING

## 2022-06-02 ASSESSMENT — LOCATION DETAILED DESCRIPTION DERM
LOCATION DETAILED: LEFT SUPERIOR LATERAL UPPER BACK
LOCATION DETAILED: LEFT DISTAL PRETIBIAL REGION
LOCATION DETAILED: LEFT CLAVICULAR NECK
LOCATION DETAILED: LEFT MID-UPPER BACK
LOCATION DETAILED: NASAL SUPRATIP
LOCATION DETAILED: RIGHT RADIAL DORSAL HAND
LOCATION DETAILED: LEFT LATERAL FOREHEAD
LOCATION DETAILED: RIGHT MEDIAL MALAR CHEEK
LOCATION DETAILED: PERIUMBILICAL SKIN
LOCATION DETAILED: LEFT INFERIOR UPPER BACK
LOCATION DETAILED: RIGHT ANTERIOR PROXIMAL THIGH
LOCATION DETAILED: RIGHT VENTRAL DISTAL FOREARM
LOCATION DETAILED: RIGHT INFERIOR LATERAL FOREHEAD
LOCATION DETAILED: RIGHT MEDIAL INFERIOR CHEST
LOCATION DETAILED: LEFT ANTERIOR PROXIMAL THIGH

## 2022-06-02 ASSESSMENT — LOCATION ZONE DERM
LOCATION ZONE: NECK
LOCATION ZONE: ARM
LOCATION ZONE: TRUNK
LOCATION ZONE: FACE
LOCATION ZONE: HAND
LOCATION ZONE: LEG
LOCATION ZONE: NOSE

## 2022-06-02 ASSESSMENT — LOCATION SIMPLE DESCRIPTION DERM
LOCATION SIMPLE: LEFT PRETIBIAL REGION
LOCATION SIMPLE: CHEST
LOCATION SIMPLE: RIGHT FOREARM
LOCATION SIMPLE: LEFT ANTERIOR NECK
LOCATION SIMPLE: ABDOMEN
LOCATION SIMPLE: RIGHT CHEEK
LOCATION SIMPLE: LEFT THIGH
LOCATION SIMPLE: LEFT UPPER BACK
LOCATION SIMPLE: RIGHT THIGH
LOCATION SIMPLE: RIGHT FOREHEAD
LOCATION SIMPLE: LEFT FOREHEAD
LOCATION SIMPLE: NOSE
LOCATION SIMPLE: RIGHT HAND

## 2022-12-02 ENCOUNTER — APPOINTMENT (RX ONLY)
Dept: URBAN - METROPOLITAN AREA CLINIC 33 | Facility: CLINIC | Age: 87
Setting detail: DERMATOLOGY
End: 2022-12-02

## 2022-12-02 DIAGNOSIS — L85.3 XEROSIS CUTIS: ICD-10-CM

## 2022-12-02 DIAGNOSIS — L82.1 OTHER SEBORRHEIC KERATOSIS: ICD-10-CM

## 2022-12-02 DIAGNOSIS — L57.0 ACTINIC KERATOSIS: ICD-10-CM

## 2022-12-02 DIAGNOSIS — L24 IRRITANT CONTACT DERMATITIS: ICD-10-CM

## 2022-12-02 DIAGNOSIS — D18.0 HEMANGIOMA: ICD-10-CM

## 2022-12-02 DIAGNOSIS — Z85.828 PERSONAL HISTORY OF OTHER MALIGNANT NEOPLASM OF SKIN: ICD-10-CM

## 2022-12-02 DIAGNOSIS — D22 MELANOCYTIC NEVI: ICD-10-CM

## 2022-12-02 PROBLEM — D22.5 MELANOCYTIC NEVI OF TRUNK: Status: ACTIVE | Noted: 2022-12-02

## 2022-12-02 PROBLEM — L24.9 IRRITANT CONTACT DERMATITIS, UNSPECIFIED CAUSE: Status: ACTIVE | Noted: 2022-12-02

## 2022-12-02 PROBLEM — D18.01 HEMANGIOMA OF SKIN AND SUBCUTANEOUS TISSUE: Status: ACTIVE | Noted: 2022-12-02

## 2022-12-02 PROCEDURE — 99213 OFFICE O/P EST LOW 20 MIN: CPT | Mod: 25

## 2022-12-02 PROCEDURE — 17000 DESTRUCT PREMALG LESION: CPT

## 2022-12-02 PROCEDURE — ? TREATMENT REGIMEN

## 2022-12-02 PROCEDURE — ? SUNSCREEN RECOMMENDATIONS

## 2022-12-02 PROCEDURE — ? LIQUID NITROGEN

## 2022-12-02 PROCEDURE — ? COUNSELING

## 2022-12-02 ASSESSMENT — LOCATION DETAILED DESCRIPTION DERM
LOCATION DETAILED: INFERIOR THORACIC SPINE
LOCATION DETAILED: LEFT DISTAL PRETIBIAL REGION
LOCATION DETAILED: RIGHT KNEE
LOCATION DETAILED: LEFT MID-UPPER BACK
LOCATION DETAILED: LEFT SUPERIOR LATERAL UPPER BACK
LOCATION DETAILED: NASAL SUPRATIP
LOCATION DETAILED: LEFT INFERIOR UPPER BACK
LOCATION DETAILED: LEFT KNEE

## 2022-12-02 ASSESSMENT — LOCATION SIMPLE DESCRIPTION DERM
LOCATION SIMPLE: LEFT UPPER BACK
LOCATION SIMPLE: RIGHT KNEE
LOCATION SIMPLE: LEFT KNEE
LOCATION SIMPLE: LEFT PRETIBIAL REGION
LOCATION SIMPLE: UPPER BACK
LOCATION SIMPLE: NOSE

## 2022-12-02 ASSESSMENT — LOCATION ZONE DERM
LOCATION ZONE: LEG
LOCATION ZONE: TRUNK
LOCATION ZONE: NOSE

## 2022-12-02 NOTE — PROCEDURE: TREATMENT REGIMEN
Initiate Treatment: Topical fluticasone (outside Rx) for 1 week then Vaseline daily until fully healed
Discontinue Regimen: Neosporin
Plan: Problem started as abrasions 1 month ago secondary to fall
Detail Level: Zone

## 2022-12-02 NOTE — HPI: SKIN LESION
How Severe Is Your Skin Lesion?: mild
Has Your Skin Lesion Been Treated?: not been treated
Is This A New Presentation, Or A Follow-Up?: Skin Lesions
Additional History: Patient injured knees secondary to a fall 1 month ago. He has applied bactroban and neosporin.

## 2023-06-02 ENCOUNTER — APPOINTMENT (RX ONLY)
Dept: URBAN - METROPOLITAN AREA CLINIC 33 | Facility: CLINIC | Age: 88
Setting detail: DERMATOLOGY
End: 2023-06-02

## 2023-06-02 DIAGNOSIS — L73.8 OTHER SPECIFIED FOLLICULAR DISORDERS: ICD-10-CM

## 2023-06-02 DIAGNOSIS — Z85.828 PERSONAL HISTORY OF OTHER MALIGNANT NEOPLASM OF SKIN: ICD-10-CM

## 2023-06-02 DIAGNOSIS — L82.1 OTHER SEBORRHEIC KERATOSIS: ICD-10-CM

## 2023-06-02 DIAGNOSIS — L60.8 OTHER NAIL DISORDERS: ICD-10-CM

## 2023-06-02 DIAGNOSIS — D18.0 HEMANGIOMA: ICD-10-CM

## 2023-06-02 DIAGNOSIS — L85.3 XEROSIS CUTIS: ICD-10-CM

## 2023-06-02 DIAGNOSIS — D22 MELANOCYTIC NEVI: ICD-10-CM

## 2023-06-02 DIAGNOSIS — L81.4 OTHER MELANIN HYPERPIGMENTATION: ICD-10-CM

## 2023-06-02 PROBLEM — D22.5 MELANOCYTIC NEVI OF TRUNK: Status: ACTIVE | Noted: 2023-06-02

## 2023-06-02 PROBLEM — D18.01 HEMANGIOMA OF SKIN AND SUBCUTANEOUS TISSUE: Status: ACTIVE | Noted: 2023-06-02

## 2023-06-02 PROCEDURE — ? COUNSELING

## 2023-06-02 PROCEDURE — 99213 OFFICE O/P EST LOW 20 MIN: CPT

## 2023-06-02 PROCEDURE — ? SUNSCREEN RECOMMENDATIONS

## 2023-06-02 ASSESSMENT — LOCATION DETAILED DESCRIPTION DERM
LOCATION DETAILED: LEFT DISTAL PRETIBIAL REGION
LOCATION DETAILED: RIGHT CENTRAL MALAR CHEEK
LOCATION DETAILED: RIGHT DISTAL PRETIBIAL REGION
LOCATION DETAILED: RIGHT SUPERIOR MEDIAL UPPER BACK
LOCATION DETAILED: LEFT MID-UPPER BACK
LOCATION DETAILED: RIGHT SUPERIOR UPPER BACK
LOCATION DETAILED: NASAL SUPRATIP
LOCATION DETAILED: LEFT INFERIOR UPPER BACK
LOCATION DETAILED: RIGHT THUMBNAIL

## 2023-06-02 ASSESSMENT — LOCATION ZONE DERM
LOCATION ZONE: TRUNK
LOCATION ZONE: LEG
LOCATION ZONE: FACE
LOCATION ZONE: FINGERNAIL
LOCATION ZONE: NOSE

## 2023-06-02 ASSESSMENT — LOCATION SIMPLE DESCRIPTION DERM
LOCATION SIMPLE: LEFT UPPER BACK
LOCATION SIMPLE: RIGHT THUMB
LOCATION SIMPLE: RIGHT PRETIBIAL REGION
LOCATION SIMPLE: RIGHT CHEEK
LOCATION SIMPLE: RIGHT UPPER BACK
LOCATION SIMPLE: LEFT PRETIBIAL REGION
LOCATION SIMPLE: NOSE

## 2023-06-02 NOTE — HPI: EVALUATION OF SKIN LESION(S)
What Type Of Note Output Would You Prefer (Optional)?: Bullet Format
Hpi Title: Evaluation of Skin Lesions
How Severe Are Your Spot(S)?: mild
Have Your Spot(S) Been Treated In The Past?: has not been treated
Additional History: Established patient with history of BCC is here today for FBSE.

## 2023-12-05 ENCOUNTER — APPOINTMENT (RX ONLY)
Dept: URBAN - METROPOLITAN AREA CLINIC 33 | Facility: CLINIC | Age: 88
Setting detail: DERMATOLOGY
End: 2023-12-05

## 2023-12-05 DIAGNOSIS — Z85.828 PERSONAL HISTORY OF OTHER MALIGNANT NEOPLASM OF SKIN: ICD-10-CM

## 2023-12-05 DIAGNOSIS — L81.4 OTHER MELANIN HYPERPIGMENTATION: ICD-10-CM

## 2023-12-05 DIAGNOSIS — L82.1 OTHER SEBORRHEIC KERATOSIS: ICD-10-CM

## 2023-12-05 DIAGNOSIS — L60.8 OTHER NAIL DISORDERS: ICD-10-CM

## 2023-12-05 DIAGNOSIS — L73.8 OTHER SPECIFIED FOLLICULAR DISORDERS: ICD-10-CM

## 2023-12-05 DIAGNOSIS — D22 MELANOCYTIC NEVI: ICD-10-CM

## 2023-12-05 DIAGNOSIS — D18.0 HEMANGIOMA: ICD-10-CM

## 2023-12-05 DIAGNOSIS — L57.0 ACTINIC KERATOSIS: ICD-10-CM

## 2023-12-05 DIAGNOSIS — L85.3 XEROSIS CUTIS: ICD-10-CM

## 2023-12-05 PROBLEM — D18.01 HEMANGIOMA OF SKIN AND SUBCUTANEOUS TISSUE: Status: ACTIVE | Noted: 2023-12-05

## 2023-12-05 PROBLEM — D22.5 MELANOCYTIC NEVI OF TRUNK: Status: ACTIVE | Noted: 2023-12-05

## 2023-12-05 PROCEDURE — ? SUNSCREEN RECOMMENDATIONS

## 2023-12-05 PROCEDURE — ? LIQUID NITROGEN

## 2023-12-05 PROCEDURE — 17000 DESTRUCT PREMALG LESION: CPT

## 2023-12-05 PROCEDURE — ? COUNSELING

## 2023-12-05 PROCEDURE — 99213 OFFICE O/P EST LOW 20 MIN: CPT | Mod: 25

## 2023-12-05 ASSESSMENT — LOCATION ZONE DERM
LOCATION ZONE: FINGERNAIL
LOCATION ZONE: FACE
LOCATION ZONE: ARM
LOCATION ZONE: EAR
LOCATION ZONE: NOSE
LOCATION ZONE: LEG
LOCATION ZONE: TRUNK

## 2023-12-05 ASSESSMENT — LOCATION SIMPLE DESCRIPTION DERM
LOCATION SIMPLE: UPPER BACK
LOCATION SIMPLE: LEFT UPPER BACK
LOCATION SIMPLE: RIGHT CHEEK
LOCATION SIMPLE: LEFT FOREARM
LOCATION SIMPLE: LEFT EAR
LOCATION SIMPLE: LEFT PRETIBIAL REGION
LOCATION SIMPLE: RIGHT PRETIBIAL REGION
LOCATION SIMPLE: NOSE
LOCATION SIMPLE: RIGHT FOREARM
LOCATION SIMPLE: ABDOMEN
LOCATION SIMPLE: RIGHT UPPER BACK
LOCATION SIMPLE: RIGHT HAND

## 2023-12-05 ASSESSMENT — LOCATION DETAILED DESCRIPTION DERM
LOCATION DETAILED: RIGHT CENTRAL MALAR CHEEK
LOCATION DETAILED: LEFT DISTAL DORSAL FOREARM
LOCATION DETAILED: PERIUMBILICAL SKIN
LOCATION DETAILED: LEFT DISTAL PRETIBIAL REGION
LOCATION DETAILED: LEFT SUPERIOR POSTERIOR HELIX
LOCATION DETAILED: NASAL TIP
LOCATION DETAILED: RIGHT PROXIMAL PRETIBIAL REGION
LOCATION DETAILED: SUPERIOR THORACIC SPINE
LOCATION DETAILED: RIGHT PROXIMAL DORSAL FOREARM
LOCATION DETAILED: LEFT SUPERIOR UPPER BACK
LOCATION DETAILED: RIGHT MID-UPPER BACK
LOCATION DETAILED: RIGHT THUMBNAIL

## 2023-12-05 NOTE — PROCEDURE: LIQUID NITROGEN
Consent: The patient's consent was obtained including but not limited to risks of crusting, scabbing, blistering, scarring, darker or lighter pigmentary change, recurrence, incomplete removal and infection.
Show Applicator Variable?: Yes
Render Post-Care Instructions In Note?: no
Post-Care Instructions: I reviewed with the patient in detail post-care instructions. Patient is to wear sunprotection, and avoid picking at any of the treated lesions. Pt may apply Vaseline to crusted or scabbing areas.
Duration Of Freeze Thaw-Cycle (Seconds): 0
Detail Level: Zone

## 2023-12-05 NOTE — HPI: SKIN LESION
How Severe Is Your Skin Lesion?: mild
Has Your Skin Lesion Been Treated?: not been treated
Is This A New Presentation, Or A Follow-Up?: Skin Lesions
Additional History: Patient has also experienced some itching involving his R upper back for a couple months. He has not noticed a rash. The problem has improved - he most recently experienced itching 1 month ago.

## 2024-06-19 ENCOUNTER — APPOINTMENT (RX ONLY)
Dept: URBAN - METROPOLITAN AREA CLINIC 33 | Facility: CLINIC | Age: 89
Setting detail: DERMATOLOGY
End: 2024-06-19

## 2024-06-19 DIAGNOSIS — L57.0 ACTINIC KERATOSIS: ICD-10-CM

## 2024-06-19 DIAGNOSIS — L81.4 OTHER MELANIN HYPERPIGMENTATION: ICD-10-CM

## 2024-06-19 DIAGNOSIS — D18.0 HEMANGIOMA: ICD-10-CM

## 2024-06-19 DIAGNOSIS — L82.1 OTHER SEBORRHEIC KERATOSIS: ICD-10-CM

## 2024-06-19 DIAGNOSIS — D22 MELANOCYTIC NEVI: ICD-10-CM

## 2024-06-19 DIAGNOSIS — Z85.828 PERSONAL HISTORY OF OTHER MALIGNANT NEOPLASM OF SKIN: ICD-10-CM

## 2024-06-19 PROBLEM — D18.01 HEMANGIOMA OF SKIN AND SUBCUTANEOUS TISSUE: Status: ACTIVE | Noted: 2024-06-19

## 2024-06-19 PROBLEM — D22.5 MELANOCYTIC NEVI OF TRUNK: Status: ACTIVE | Noted: 2024-06-19

## 2024-06-19 PROCEDURE — ? SUNSCREEN RECOMMENDATIONS

## 2024-06-19 PROCEDURE — ? COUNSELING

## 2024-06-19 PROCEDURE — 17000 DESTRUCT PREMALG LESION: CPT

## 2024-06-19 PROCEDURE — ? LIQUID NITROGEN

## 2024-06-19 PROCEDURE — 99213 OFFICE O/P EST LOW 20 MIN: CPT | Mod: 25

## 2024-06-19 PROCEDURE — 17003 DESTRUCT PREMALG LES 2-14: CPT

## 2024-06-19 ASSESSMENT — LOCATION ZONE DERM
LOCATION ZONE: ARM
LOCATION ZONE: NOSE
LOCATION ZONE: SCALP
LOCATION ZONE: TRUNK
LOCATION ZONE: LEG

## 2024-06-19 ASSESSMENT — LOCATION SIMPLE DESCRIPTION DERM
LOCATION SIMPLE: RIGHT NOSE
LOCATION SIMPLE: RIGHT FOREARM
LOCATION SIMPLE: RIGHT UPPER BACK
LOCATION SIMPLE: UPPER BACK
LOCATION SIMPLE: ABDOMEN
LOCATION SIMPLE: SCALP
LOCATION SIMPLE: LEFT PRETIBIAL REGION
LOCATION SIMPLE: RIGHT PRETIBIAL REGION
LOCATION SIMPLE: NOSE

## 2024-06-19 ASSESSMENT — LOCATION DETAILED DESCRIPTION DERM
LOCATION DETAILED: RIGHT DISTAL PRETIBIAL REGION
LOCATION DETAILED: LEFT DISTAL PRETIBIAL REGION
LOCATION DETAILED: RIGHT SUPERIOR MEDIAL UPPER BACK
LOCATION DETAILED: PERIUMBILICAL SKIN
LOCATION DETAILED: NASAL SUPRATIP
LOCATION DETAILED: RIGHT SUPERIOR PARIETAL SCALP
LOCATION DETAILED: RIGHT DISTAL DORSAL FOREARM
LOCATION DETAILED: LEFT SUPERIOR PARIETAL SCALP
LOCATION DETAILED: INFERIOR THORACIC SPINE
LOCATION DETAILED: RIGHT NASAL ALA

## 2024-12-19 ENCOUNTER — APPOINTMENT (OUTPATIENT)
Dept: URBAN - METROPOLITAN AREA CLINIC 33 | Facility: CLINIC | Age: 89
Setting detail: DERMATOLOGY
End: 2024-12-19

## 2024-12-19 DIAGNOSIS — D18.0 HEMANGIOMA: ICD-10-CM

## 2024-12-19 DIAGNOSIS — L72.0 EPIDERMAL CYST: ICD-10-CM

## 2024-12-19 DIAGNOSIS — L81.4 OTHER MELANIN HYPERPIGMENTATION: ICD-10-CM

## 2024-12-19 DIAGNOSIS — D22 MELANOCYTIC NEVI: ICD-10-CM

## 2024-12-19 DIAGNOSIS — L82.1 OTHER SEBORRHEIC KERATOSIS: ICD-10-CM

## 2024-12-19 DIAGNOSIS — Z85.828 PERSONAL HISTORY OF OTHER MALIGNANT NEOPLASM OF SKIN: ICD-10-CM

## 2024-12-19 DIAGNOSIS — L73.8 OTHER SPECIFIED FOLLICULAR DISORDERS: ICD-10-CM

## 2024-12-19 DIAGNOSIS — L91.8 OTHER HYPERTROPHIC DISORDERS OF THE SKIN: ICD-10-CM

## 2024-12-19 DIAGNOSIS — L21.8 OTHER SEBORRHEIC DERMATITIS: ICD-10-CM

## 2024-12-19 PROBLEM — D18.01 HEMANGIOMA OF SKIN AND SUBCUTANEOUS TISSUE: Status: ACTIVE | Noted: 2024-12-19

## 2024-12-19 PROBLEM — D22.5 MELANOCYTIC NEVI OF TRUNK: Status: ACTIVE | Noted: 2024-12-19

## 2024-12-19 PROCEDURE — ? PRESCRIPTION MEDICATION MANAGEMENT

## 2024-12-19 PROCEDURE — ? SUNSCREEN RECOMMENDATIONS

## 2024-12-19 PROCEDURE — 99213 OFFICE O/P EST LOW 20 MIN: CPT

## 2024-12-19 PROCEDURE — ? PRESCRIPTION

## 2024-12-19 PROCEDURE — ? COUNSELING

## 2024-12-19 RX ORDER — FLUOCINONIDE 0.5 MG/ML
SOLUTION TOPICAL AS DIRECTED
Qty: 60 | Refills: 2 | Status: ERX | COMMUNITY
Start: 2024-12-19

## 2024-12-19 RX ADMIN — FLUOCINONIDE: 0.5 SOLUTION TOPICAL at 00:00

## 2024-12-19 ASSESSMENT — LOCATION ZONE DERM
LOCATION ZONE: SCALP
LOCATION ZONE: NOSE
LOCATION ZONE: TRUNK
LOCATION ZONE: ARM
LOCATION ZONE: FACE

## 2024-12-19 ASSESSMENT — LOCATION SIMPLE DESCRIPTION DERM
LOCATION SIMPLE: RIGHT UPPER BACK
LOCATION SIMPLE: NOSE
LOCATION SIMPLE: ABDOMEN
LOCATION SIMPLE: LEFT FOREARM
LOCATION SIMPLE: POSTERIOR SCALP
LOCATION SIMPLE: RIGHT CHEEK
LOCATION SIMPLE: LEFT CHEEK

## 2024-12-19 ASSESSMENT — LOCATION DETAILED DESCRIPTION DERM
LOCATION DETAILED: LEFT CENTRAL MALAR CHEEK
LOCATION DETAILED: RIGHT RIB CAGE
LOCATION DETAILED: LEFT DISTAL DORSAL FOREARM
LOCATION DETAILED: EPIGASTRIC SKIN
LOCATION DETAILED: MID-OCCIPITAL SCALP
LOCATION DETAILED: RIGHT INFERIOR MEDIAL UPPER BACK
LOCATION DETAILED: LEFT LATERAL ABDOMEN
LOCATION DETAILED: NASAL SUPRATIP
LOCATION DETAILED: RIGHT INFERIOR CENTRAL MALAR CHEEK

## 2024-12-19 NOTE — PROCEDURE: PRESCRIPTION MEDICATION MANAGEMENT
Initiate Treatment: Fluocinonide solution\\nOTC Head and Shoulders shampoo
Detail Level: Zone
Render In Strict Bullet Format?: No

## 2024-12-19 NOTE — HPI: EVALUATION OF SKIN LESION(S)
Hpi Title: Evaluation of Skin Lesions
How Severe Are Your Spot(S)?: mild
Additional History: Patient is present today for FBSE for years, history of AK and BCC.

## 2025-06-19 ENCOUNTER — APPOINTMENT (OUTPATIENT)
Dept: URBAN - METROPOLITAN AREA CLINIC 33 | Facility: CLINIC | Age: OVER 89
Setting detail: DERMATOLOGY
End: 2025-06-19

## 2025-06-19 DIAGNOSIS — D18.0 HEMANGIOMA: ICD-10-CM

## 2025-06-19 DIAGNOSIS — L82.0 INFLAMED SEBORRHEIC KERATOSIS: ICD-10-CM

## 2025-06-19 DIAGNOSIS — L81.4 OTHER MELANIN HYPERPIGMENTATION: ICD-10-CM

## 2025-06-19 DIAGNOSIS — D22 MELANOCYTIC NEVI: ICD-10-CM

## 2025-06-19 DIAGNOSIS — L21.8 OTHER SEBORRHEIC DERMATITIS: ICD-10-CM

## 2025-06-19 DIAGNOSIS — L57.0 ACTINIC KERATOSIS: ICD-10-CM

## 2025-06-19 DIAGNOSIS — L82.1 OTHER SEBORRHEIC KERATOSIS: ICD-10-CM

## 2025-06-19 DIAGNOSIS — Z85.828 PERSONAL HISTORY OF OTHER MALIGNANT NEOPLASM OF SKIN: ICD-10-CM

## 2025-06-19 DIAGNOSIS — L73.8 OTHER SPECIFIED FOLLICULAR DISORDERS: ICD-10-CM

## 2025-06-19 DIAGNOSIS — L85.3 XEROSIS CUTIS: ICD-10-CM

## 2025-06-19 PROBLEM — D22.5 MELANOCYTIC NEVI OF TRUNK: Status: ACTIVE | Noted: 2025-06-19

## 2025-06-19 PROBLEM — D18.01 HEMANGIOMA OF SKIN AND SUBCUTANEOUS TISSUE: Status: ACTIVE | Noted: 2025-06-19

## 2025-06-19 PROCEDURE — ? PRESCRIPTION

## 2025-06-19 PROCEDURE — ? PRESCRIPTION MEDICATION MANAGEMENT

## 2025-06-19 PROCEDURE — ? LIQUID NITROGEN

## 2025-06-19 PROCEDURE — ? SUNSCREEN RECOMMENDATIONS

## 2025-06-19 PROCEDURE — ? COUNSELING

## 2025-06-19 RX ORDER — KETOCONAZOLE 20 MG/G
CREAM TOPICAL BID
Qty: 30 | Refills: 2 | Status: ERX | COMMUNITY
Start: 2025-06-19

## 2025-06-19 RX ADMIN — KETOCONAZOLE: 20 CREAM TOPICAL at 00:00

## 2025-06-19 ASSESSMENT — LOCATION DETAILED DESCRIPTION DERM
LOCATION DETAILED: RIGHT RADIAL DORSAL HAND
LOCATION DETAILED: NASAL SUPRATIP
LOCATION DETAILED: LEFT ULNAR DORSAL HAND
LOCATION DETAILED: LEFT DISTAL DORSAL FOREARM
LOCATION DETAILED: POSTERIOR MID-PARIETAL SCALP
LOCATION DETAILED: RIGHT CENTRAL MALAR CHEEK
LOCATION DETAILED: LEFT LATERAL ABDOMEN
LOCATION DETAILED: RIGHT MEDIAL FRONTAL SCALP
LOCATION DETAILED: LEFT LATERAL MALAR CHEEK
LOCATION DETAILED: MID-OCCIPITAL SCALP
LOCATION DETAILED: RIGHT MEDIAL SUPERIOR CHEST
LOCATION DETAILED: LEFT MEDIAL MALAR CHEEK
LOCATION DETAILED: LEFT SUPERIOR UPPER BACK
LOCATION DETAILED: RIGHT MEDIAL MALAR CHEEK
LOCATION DETAILED: RIGHT INFERIOR MEDIAL UPPER BACK

## 2025-06-19 ASSESSMENT — LOCATION ZONE DERM
LOCATION ZONE: SCALP
LOCATION ZONE: HAND
LOCATION ZONE: FACE
LOCATION ZONE: TRUNK
LOCATION ZONE: NOSE
LOCATION ZONE: ARM

## 2025-06-19 ASSESSMENT — LOCATION SIMPLE DESCRIPTION DERM
LOCATION SIMPLE: CHEST
LOCATION SIMPLE: RIGHT CHEEK
LOCATION SIMPLE: ABDOMEN
LOCATION SIMPLE: NOSE
LOCATION SIMPLE: POSTERIOR SCALP
LOCATION SIMPLE: LEFT FOREARM
LOCATION SIMPLE: LEFT CHEEK
LOCATION SIMPLE: RIGHT HAND
LOCATION SIMPLE: RIGHT SCALP
LOCATION SIMPLE: RIGHT UPPER BACK
LOCATION SIMPLE: LEFT HAND
LOCATION SIMPLE: LEFT UPPER BACK

## 2025-06-19 NOTE — PROCEDURE: LIQUID NITROGEN
Consent: The patient's consent was obtained including but not limited to risks of crusting, scabbing, blistering, scarring, darker or lighter pigmentary change, recurrence, incomplete removal and infection.
Render Note In Bullet Format When Appropriate: No
Spray Paint Text: The liquid nitrogen was applied to the skin utilizing a spray paint frosting technique.
Medical Necessity Clause: This procedure was medically necessary because the lesions that were treated were:
Medical Necessity Information: It is in your best interest to select a reason for this procedure from the list below. All of these items fulfill various CMS LCD requirements except the new and changing color options.
Show Spray Paint Technique Variable?: Yes
Detail Level: Detailed
Post-Care Instructions: I reviewed with the patient in detail post-care instructions. Patient is to wear sunprotection, and avoid picking at any of the treated lesions. Pt may apply Vaseline to crusted or scabbing areas.
Duration Of Freeze Thaw-Cycle (Seconds): 0

## 2025-06-19 NOTE — HPI: EVALUATION OF SKIN LESION(S)
What Type Of Note Output Would You Prefer (Optional)?: Bullet Format
Hpi Title: Evaluation of Skin Lesions
How Severe Are Your Spot(S)?: mild
Have Your Spot(S) Been Treated In The Past?: has not been treated
Additional History: Patient is here today for full body skin examination. Patient has spots on the chest that he would like to have evaluated.

## 2025-06-19 NOTE — PROCEDURE: SUNSCREEN RECOMMENDATIONS
